# Patient Record
Sex: FEMALE | Race: WHITE | NOT HISPANIC OR LATINO | Employment: UNEMPLOYED | URBAN - METROPOLITAN AREA
[De-identification: names, ages, dates, MRNs, and addresses within clinical notes are randomized per-mention and may not be internally consistent; named-entity substitution may affect disease eponyms.]

---

## 2024-01-01 ENCOUNTER — HOSPITAL ENCOUNTER (EMERGENCY)
Facility: HOSPITAL | Age: 49
Discharge: PSYCHIATRIC OR SUBSTANCE USE FACILITY | End: 2024-01-02
Attending: EMERGENCY MEDICINE
Payer: COMMERCIAL

## 2024-01-01 VITALS
TEMPERATURE: 98 F | SYSTOLIC BLOOD PRESSURE: 120 MMHG | OXYGEN SATURATION: 96 % | BODY MASS INDEX: 35.38 KG/M2 | HEART RATE: 77 BPM | WEIGHT: 164 LBS | DIASTOLIC BLOOD PRESSURE: 74 MMHG | HEIGHT: 57 IN | RESPIRATION RATE: 17 BRPM

## 2024-01-01 DIAGNOSIS — R00.0 TACHYCARDIA: ICD-10-CM

## 2024-01-01 DIAGNOSIS — T50.902A INTENTIONAL OVERDOSE, INITIAL ENCOUNTER: Primary | ICD-10-CM

## 2024-01-01 LAB
ALBUMIN SERPL-MCNC: 4.1 G/DL (ref 3.5–5)
ALBUMIN/GLOB SERPL: 1.1 RATIO (ref 1.1–2)
ALP SERPL-CCNC: 117 UNIT/L (ref 40–150)
ALT SERPL-CCNC: 22 UNIT/L (ref 0–55)
AMPHET UR QL SCN: NEGATIVE
APAP SERPL-MCNC: <17.4 UG/ML (ref 17.4–30)
APPEARANCE UR: CLEAR
AST SERPL-CCNC: 28 UNIT/L (ref 5–34)
B-HCG SERPL QL: NEGATIVE
BARBITURATE SCN PRESENT UR: NEGATIVE
BASOPHILS # BLD AUTO: 0.03 X10(3)/MCL
BASOPHILS NFR BLD AUTO: 0.5 %
BENZODIAZ UR QL SCN: POSITIVE
BILIRUB SERPL-MCNC: 0.7 MG/DL
BILIRUB UR QL STRIP.AUTO: NEGATIVE
BUN SERPL-MCNC: 11 MG/DL (ref 9.8–20.1)
CALCIUM SERPL-MCNC: 9.9 MG/DL (ref 8.4–10.2)
CANNABINOIDS UR QL SCN: NEGATIVE
CHLORIDE SERPL-SCNC: 106 MMOL/L (ref 98–107)
CO2 SERPL-SCNC: 24 MMOL/L (ref 22–29)
COCAINE UR QL SCN: NEGATIVE
COLOR UR AUTO: YELLOW
CREAT SERPL-MCNC: 0.81 MG/DL (ref 0.55–1.02)
EOSINOPHIL # BLD AUTO: 0.01 X10(3)/MCL (ref 0–0.9)
EOSINOPHIL NFR BLD AUTO: 0.2 %
ERYTHROCYTE [DISTWIDTH] IN BLOOD BY AUTOMATED COUNT: 16 % (ref 11.5–17)
ETHANOL SERPL-MCNC: <10 MG/DL
FENTANYL UR QL SCN: NEGATIVE
GFR SERPLBLD CREATININE-BSD FMLA CKD-EPI: >60 MLS/MIN/1.73/M2
GLOBULIN SER-MCNC: 3.8 GM/DL (ref 2.4–3.5)
GLUCOSE SERPL-MCNC: 126 MG/DL (ref 74–100)
GLUCOSE UR QL STRIP.AUTO: NEGATIVE
HCT VFR BLD AUTO: 48.3 % (ref 37–47)
HGB BLD-MCNC: 15.6 G/DL (ref 12–16)
IMM GRANULOCYTES # BLD AUTO: 0.02 X10(3)/MCL (ref 0–0.04)
IMM GRANULOCYTES NFR BLD AUTO: 0.3 %
KETONES UR QL STRIP.AUTO: NEGATIVE
LEUKOCYTE ESTERASE UR QL STRIP.AUTO: NEGATIVE
LYMPHOCYTES # BLD AUTO: 1.43 X10(3)/MCL (ref 0.6–4.6)
LYMPHOCYTES NFR BLD AUTO: 24.5 %
MCH RBC QN AUTO: 28.6 PG (ref 27–31)
MCHC RBC AUTO-ENTMCNC: 32.3 G/DL (ref 33–36)
MCV RBC AUTO: 88.5 FL (ref 80–94)
MDMA UR QL SCN: NEGATIVE
MONOCYTES # BLD AUTO: 0.35 X10(3)/MCL (ref 0.1–1.3)
MONOCYTES NFR BLD AUTO: 6 %
NEUTROPHILS # BLD AUTO: 4 X10(3)/MCL (ref 2.1–9.2)
NEUTROPHILS NFR BLD AUTO: 68.5 %
NITRITE UR QL STRIP.AUTO: NEGATIVE
NRBC BLD AUTO-RTO: 0 %
OPIATES UR QL SCN: NEGATIVE
PCP UR QL: NEGATIVE
PH UR STRIP.AUTO: 6 [PH]
PH UR: 6 [PH] (ref 3–11)
PLATELET # BLD AUTO: 354 X10(3)/MCL (ref 130–400)
PMV BLD AUTO: 9.5 FL (ref 7.4–10.4)
POTASSIUM SERPL-SCNC: 3.9 MMOL/L (ref 3.5–5.1)
PROT SERPL-MCNC: 7.9 GM/DL (ref 6.4–8.3)
PROT UR QL STRIP.AUTO: NEGATIVE
RBC # BLD AUTO: 5.46 X10(6)/MCL (ref 4.2–5.4)
RBC UR QL AUTO: NEGATIVE
SARS-COV-2 RDRP RESP QL NAA+PROBE: NEGATIVE
SODIUM SERPL-SCNC: 140 MMOL/L (ref 136–145)
SP GR UR STRIP.AUTO: 1.02 (ref 1–1.03)
SPECIFIC GRAVITY, URINE AUTO (.000) (OHS): 1.02 (ref 1–1.03)
TSH SERPL-ACNC: 0.61 UIU/ML (ref 0.35–4.94)
UROBILINOGEN UR STRIP-ACNC: 0.2
WBC # SPEC AUTO: 5.84 X10(3)/MCL (ref 4.5–11.5)

## 2024-01-01 PROCEDURE — 93010 ELECTROCARDIOGRAM REPORT: CPT | Mod: ,,, | Performed by: INTERNAL MEDICINE

## 2024-01-01 PROCEDURE — 93005 ELECTROCARDIOGRAM TRACING: CPT | Performed by: INTERNAL MEDICINE

## 2024-01-01 PROCEDURE — 81003 URINALYSIS AUTO W/O SCOPE: CPT | Mod: 59 | Performed by: EMERGENCY MEDICINE

## 2024-01-01 PROCEDURE — 80307 DRUG TEST PRSMV CHEM ANLYZR: CPT | Performed by: EMERGENCY MEDICINE

## 2024-01-01 PROCEDURE — 80053 COMPREHEN METABOLIC PANEL: CPT | Performed by: EMERGENCY MEDICINE

## 2024-01-01 PROCEDURE — 87635 SARS-COV-2 COVID-19 AMP PRB: CPT | Performed by: EMERGENCY MEDICINE

## 2024-01-01 PROCEDURE — 80143 DRUG ASSAY ACETAMINOPHEN: CPT | Performed by: EMERGENCY MEDICINE

## 2024-01-01 PROCEDURE — 82077 ASSAY SPEC XCP UR&BREATH IA: CPT | Performed by: EMERGENCY MEDICINE

## 2024-01-01 PROCEDURE — 81025 URINE PREGNANCY TEST: CPT | Performed by: EMERGENCY MEDICINE

## 2024-01-01 PROCEDURE — 84443 ASSAY THYROID STIM HORMONE: CPT | Performed by: EMERGENCY MEDICINE

## 2024-01-01 PROCEDURE — 85025 COMPLETE CBC W/AUTO DIFF WBC: CPT | Performed by: EMERGENCY MEDICINE

## 2024-01-01 PROCEDURE — 99285 EMERGENCY DEPT VISIT HI MDM: CPT | Mod: 25

## 2024-01-01 PROCEDURE — 25000003 PHARM REV CODE 250: Performed by: EMERGENCY MEDICINE

## 2024-01-01 PROCEDURE — 96360 HYDRATION IV INFUSION INIT: CPT

## 2024-01-01 PROCEDURE — 93005 ELECTROCARDIOGRAM TRACING: CPT

## 2024-01-01 RX ORDER — DILTIAZEM HYDROCHLORIDE 120 MG/1
120 CAPSULE, EXTENDED RELEASE ORAL DAILY
Qty: 30 CAPSULE | Refills: 1 | Status: SHIPPED | OUTPATIENT
Start: 2024-01-01 | End: 2024-01-02

## 2024-01-01 RX ORDER — DILTIAZEM HYDROCHLORIDE 30 MG/1
30 TABLET, FILM COATED ORAL
Status: COMPLETED | OUTPATIENT
Start: 2024-01-01 | End: 2024-01-01

## 2024-01-01 RX ADMIN — DILTIAZEM HYDROCHLORIDE 30 MG: 30 TABLET, FILM COATED ORAL at 09:01

## 2024-01-01 RX ADMIN — SODIUM CHLORIDE 1000 ML: 9 INJECTION, SOLUTION INTRAVENOUS at 06:01

## 2024-01-01 NOTE — ED PROVIDER NOTES
Encounter Date: 1/1/2024       History     Chief Complaint   Patient presents with    Drug Overdose     Pt to Ed via EMS with suspected OD, pt has multiple stories of what she did. 1. OD of lisinopril, 2. OD on benazepril. Pt appears intoxicated, slurred speech, Alert to self. Denies SI/HI     Patient is a 48-year-old female presenting by ambulance secondary to apparent overdose.  Patient tells me that she took approximately 15 benazepril tablets and about 10 lorazepam tablets earlier today.  Patient states was trying to induce sleep.  Patient denies any other ingestion.      Review of patient's allergies indicates:  No Known Allergies  History reviewed. No pertinent past medical history.  History reviewed. No pertinent surgical history.  History reviewed. No pertinent family history.  Social History     Tobacco Use    Smoking status: Every Day     Types: Cigarettes    Smokeless tobacco: Never   Substance Use Topics    Alcohol use: Not Currently    Drug use: Not Currently     Comment: unknown     Review of Systems   Constitutional: Negative.    HENT: Negative.     Respiratory: Negative.     Cardiovascular: Negative.    Gastrointestinal: Negative.    Genitourinary: Negative.    Musculoskeletal: Negative.    Skin: Negative.    Neurological: Negative.    Psychiatric/Behavioral:  Positive for dysphoric mood, self-injury and sleep disturbance. Negative for hallucinations and suicidal ideas.        Physical Exam     Initial Vitals [01/01/24 1624]   BP Pulse Resp Temp SpO2   (!) 139/94 96 16 97 °F (36.1 °C) 97 %      MAP       --         Physical Exam    Nursing note and vitals reviewed.  Constitutional: She appears well-developed and well-nourished.   Patient is somnolent on arrival but she will open her eyes and answer questions appropriately.   HENT:   Head: Normocephalic and atraumatic.   Neck: Neck supple.   Normal range of motion.  Cardiovascular:  Normal rate, regular rhythm and normal heart sounds.            Pulmonary/Chest: Breath sounds normal. No respiratory distress. She has no rhonchi.   Abdominal: Abdomen is soft. There is no abdominal tenderness. There is no guarding.   Musculoskeletal:         General: Normal range of motion.      Cervical back: Normal range of motion and neck supple.     Neurological: She is alert and oriented to person, place, and time. She has normal strength.   Skin: Skin is warm.   Psychiatric:   Positive for dysphoric mood.  Patient denies suicidal ideation.  She denies hallucinations.         ED Course   Procedures  Labs Reviewed   COMPREHENSIVE METABOLIC PANEL - Abnormal; Notable for the following components:       Result Value    Glucose Level 126 (*)     Globulin 3.8 (*)     All other components within normal limits   DRUG SCREEN, URINE (BEAKER) - Abnormal; Notable for the following components:    Benzodiazepine, Urine Positive (*)     All other components within normal limits    Narrative:     Cut off concentrations:    Amphetamines - 1000 ng/ml  Barbiturates - 200 ng/ml  Benzodiazepine - 200 ng/ml  Cannabinoids (THC) - 50 ng/ml  Cocaine - 300 ng/ml  Fentanyl - 1.0 ng/ml  MDMA - 500 ng/ml  Opiates - 300 ng/ml   Phencyclidine (PCP) - 25 ng/ml    Specimen submitted for drug analysis and tested for pH and specific gravity in order to evaluate sample integrity. Suspect tampering if specific gravity is <1.003 and/or pH is not within the range of 4.5 - 8.0  False negatives may result form substances such as bleach added to urine.  False positives may result for the presence of a substance with similar chemical structure to the drug or its metabolite.    This test provides only a PRELIMINARY analytical test result. A more specific alternate chemical method must be used in order to obtain a confirmed analytical result. Gas chromatography/mass spectrometry (GC/MS) is the preferred confirmatory method. Other chemical confirmation methods are available. Clinical consideration and professional  judgement should be applied to any drug of abuse test result, particularly when preliminary positive results are used.    Positive results will be confirmed only at the physicians request. Unconfirmed screening results are to be used only for medical purposes (treatment).        ACETAMINOPHEN LEVEL - Abnormal; Notable for the following components:    Acetaminophen Level <17.4 (*)     All other components within normal limits   CBC WITH DIFFERENTIAL - Abnormal; Notable for the following components:    RBC 5.46 (*)     Hct 48.3 (*)     MCHC 32.3 (*)     All other components within normal limits   TSH - Normal   URINALYSIS, REFLEX TO URINE CULTURE - Normal   ALCOHOL,MEDICAL (ETHANOL) - Normal   SARS-COV-2 RNA AMPLIFICATION, QUAL - Normal    Narrative:     The IDNOW COVID-19 assay is a rapid molecular in vitro diagnostic test utilizing an isothermal nucleic acid amplification technology intended for the qualitative detection of nucleic acid from the SARS-CoV-2 viral RNA in direct nasal, nasopharyngeal or throat swabs from individuals who are suspected of COVID-19 by their healthcare provider.   PREGNANCY TEST, URINE RAPID - Normal   CBC W/ AUTO DIFFERENTIAL    Narrative:     The following orders were created for panel order CBC auto differential.  Procedure                               Abnormality         Status                     ---------                               -----------         ------                     CBC with Differential[[id number]]       Abnormal            Final result                 Please view results for these tests on the individual orders.     EKG Readings: (Independently Interpreted)   Initial Reading: No STEMI. Rhythm: Normal Sinus Rhythm. Heart Rate: 88. Ectopy: No Ectopy. Conduction: Normal. ST Segments: Normal ST Segments. T Waves: Normal. Axis: Normal.       Imaging Results    None          Medications   diltiaZEM tablet 30 mg (has no administration in time range)   sodium chloride 0.9%  bolus 1,000 mL 1,000 mL ( Intravenous Stopped 1/1/24 1957)     Medical Decision Making  As per HPI.  Differential diagnosis:  Self injury, medication overdose, somnolence    Amount and/or Complexity of Data Reviewed  Labs: ordered.     Details: All labs are stable  Radiology: ordered and independent interpretation performed.  ECG/medicine tests: ordered and independent interpretation performed.  Discussion of management or test interpretation with external provider(s): Patient remains in no apparent distress.  Vital signs are stable.  Patient will continue to be observed for a while secondary to intentional ingestion.  Pec has been done with this patient.  Anticipate clearance for psych placement.  Patient will be given a prescription for Cardizem 180 mg p.o. once a day.  Patient is now medically cleared for psychiatric placement.               ED Course as of 01/01/24 2059 Mon Jan 01, 2024   1648 Pec was don this patient.  [place] poison control was also called.  They recommend no charcoal, watch for reflex tachycardia and hypotension. [KG]   1800 Vital signs remained stable.  Patient remains in no apparent distress.  Will continue to observe [KG]   2002 Patient became tachycardic with a heart rate in the 130s.  Blood pressure is stable.  Patient denies chest pain.  No shortness of breath [KG]   2002 EKG was repeated, prior to EKG, patient converted into normal sinus rhythm.  It appeared like she was in an SVT.  Repeat EKG, patient is in normal sinus rhythm.  Heart rate 88 [KG]   2038 Patient remains in no apparent distress.  Vital signs are stable.  Patient is alert and oriented. [KG]      ED Course User Index  [KG] Gavin Marley MD                           Clinical Impression:  Final diagnoses:  [R00.0] Tachycardia  [T50.902A] Intentional overdose, initial encounter (Primary)          ED Disposition Condition    Transfer to Psych Facility Stable          ED Prescriptions       Medication Sig Dispense  Start Date End Date Auth. Provider    diltiaZEM (DILACOR XR) 120 MG CDCR Take 1 capsule (120 mg total) by mouth once daily. 30 capsule 1/1/2024 -- Gavin Marley MD          Follow-up Information    None          Gavin Marley MD  01/01/24 2059

## 2024-01-01 NOTE — ED NOTES
"Pt brought to room 1 via EMS. Pt states she "took a bunch of benzapril." Pt appears drowsy and lethargic. Pt VS stable. Pt pupils dialated. Pt denies SI or HI. Pt states she wanted to go to sleep while her children moved furniture. Pt states she "wants her kids back." MD at bedside.  "

## 2024-01-01 NOTE — ED NOTES
"Poison controlled called. Kirk from poison control states that if pt took the benazapril, we may see reflex tachycardia and low blood pressure. Poison control does not recommend activated charcoal due to symptoms already "being on board." Poison control requests a tylenol level and urine drug screen.  "

## 2024-01-02 ENCOUNTER — HOSPITAL ENCOUNTER (INPATIENT)
Facility: HOSPITAL | Age: 49
LOS: 1 days | Discharge: ANOTHER HOSPITAL | DRG: 881 | End: 2024-01-02
Attending: PSYCHIATRY & NEUROLOGY | Admitting: PSYCHIATRY & NEUROLOGY
Payer: COMMERCIAL

## 2024-01-02 ENCOUNTER — HOSPITAL ENCOUNTER (INPATIENT)
Facility: HOSPITAL | Age: 49
LOS: 7 days | Discharge: HOME | DRG: 918 | End: 2024-01-09
Attending: PSYCHIATRY & NEUROLOGY | Admitting: PSYCHIATRY & NEUROLOGY
Payer: COMMERCIAL

## 2024-01-02 VITALS
HEART RATE: 93 BPM | RESPIRATION RATE: 18 BRPM | SYSTOLIC BLOOD PRESSURE: 126 MMHG | TEMPERATURE: 98 F | BODY MASS INDEX: 35.6 KG/M2 | OXYGEN SATURATION: 97 % | DIASTOLIC BLOOD PRESSURE: 87 MMHG | WEIGHT: 165 LBS | HEIGHT: 57 IN

## 2024-01-02 VITALS
HEIGHT: 68 IN | RESPIRATION RATE: 18 BRPM | SYSTOLIC BLOOD PRESSURE: 121 MMHG | OXYGEN SATURATION: 99 % | BODY MASS INDEX: 26.83 KG/M2 | TEMPERATURE: 98 F | WEIGHT: 177 LBS | HEART RATE: 73 BPM | DIASTOLIC BLOOD PRESSURE: 76 MMHG

## 2024-01-02 DIAGNOSIS — R55 SYNCOPAL EPISODES: ICD-10-CM

## 2024-01-02 DIAGNOSIS — T14.91XA SUICIDE ATTEMPT: Primary | ICD-10-CM

## 2024-01-02 DIAGNOSIS — F32.9 MAJOR DEPRESSION: ICD-10-CM

## 2024-01-02 DIAGNOSIS — F32.A DEPRESSION: ICD-10-CM

## 2024-01-02 LAB
ALBUMIN SERPL-MCNC: 3.7 G/DL (ref 3.5–5)
ALBUMIN/GLOB SERPL: 1.3 RATIO (ref 1.1–2)
ALP SERPL-CCNC: 108 UNIT/L (ref 40–150)
ALT SERPL-CCNC: 19 UNIT/L (ref 0–55)
AST SERPL-CCNC: 28 UNIT/L (ref 5–34)
BASOPHILS # BLD AUTO: 0.04 X10(3)/MCL
BASOPHILS NFR BLD AUTO: 0.6 %
BILIRUB SERPL-MCNC: 0.6 MG/DL
BUN SERPL-MCNC: 11.2 MG/DL (ref 9.8–20.1)
CALCIUM SERPL-MCNC: 9.2 MG/DL (ref 8.4–10.2)
CHLORIDE SERPL-SCNC: 108 MMOL/L (ref 98–107)
CHOLEST SERPL-MCNC: 143 MG/DL
CHOLEST/HDLC SERPL: 2 {RATIO} (ref 0–5)
CO2 SERPL-SCNC: 24 MMOL/L (ref 22–29)
CREAT SERPL-MCNC: 0.68 MG/DL (ref 0.55–1.02)
EOSINOPHIL # BLD AUTO: 0.03 X10(3)/MCL (ref 0–0.9)
EOSINOPHIL NFR BLD AUTO: 0.4 %
ERYTHROCYTE [DISTWIDTH] IN BLOOD BY AUTOMATED COUNT: 15.9 % (ref 11.5–17)
EST. AVERAGE GLUCOSE BLD GHB EST-MCNC: 119.8 MG/DL
GFR SERPLBLD CREATININE-BSD FMLA CKD-EPI: >60 MLS/MIN/1.73/M2
GLOBULIN SER-MCNC: 2.9 GM/DL (ref 2.4–3.5)
GLUCOSE SERPL-MCNC: 91 MG/DL (ref 74–100)
HBA1C MFR BLD: 5.8 %
HCT VFR BLD AUTO: 45.4 % (ref 37–47)
HDLC SERPL-MCNC: 65 MG/DL (ref 35–60)
HGB BLD-MCNC: 14.6 G/DL (ref 12–16)
IMM GRANULOCYTES # BLD AUTO: 0.02 X10(3)/MCL (ref 0–0.04)
IMM GRANULOCYTES NFR BLD AUTO: 0.3 %
LDLC SERPL CALC-MCNC: 61 MG/DL (ref 50–140)
LYMPHOCYTES # BLD AUTO: 1.45 X10(3)/MCL (ref 0.6–4.6)
LYMPHOCYTES NFR BLD AUTO: 20.2 %
MCH RBC QN AUTO: 28.7 PG (ref 27–31)
MCHC RBC AUTO-ENTMCNC: 32.2 G/DL (ref 33–36)
MCV RBC AUTO: 89.4 FL (ref 80–94)
MONOCYTES # BLD AUTO: 0.8 X10(3)/MCL (ref 0.1–1.3)
MONOCYTES NFR BLD AUTO: 11.1 %
NEUTROPHILS # BLD AUTO: 4.84 X10(3)/MCL (ref 2.1–9.2)
NEUTROPHILS NFR BLD AUTO: 67.4 %
NRBC BLD AUTO-RTO: 0 %
PLATELET # BLD AUTO: 324 X10(3)/MCL (ref 130–400)
PMV BLD AUTO: 10.1 FL (ref 7.4–10.4)
POTASSIUM SERPL-SCNC: 4.4 MMOL/L (ref 3.5–5.1)
PROT SERPL-MCNC: 6.6 GM/DL (ref 6.4–8.3)
RBC # BLD AUTO: 5.08 X10(6)/MCL (ref 4.2–5.4)
SODIUM SERPL-SCNC: 143 MMOL/L (ref 136–145)
T PALLIDUM AB SER QL: NONREACTIVE
TRIGL SERPL-MCNC: 87 MG/DL (ref 37–140)
TSH SERPL-ACNC: 0.43 UIU/ML (ref 0.35–4.94)
VLDLC SERPL CALC-MCNC: 17 MG/DL
WBC # SPEC AUTO: 7.18 X10(3)/MCL (ref 4.5–11.5)

## 2024-01-02 PROCEDURE — 99284 EMERGENCY DEPT VISIT MOD MDM: CPT | Mod: 25

## 2024-01-02 PROCEDURE — 93005 ELECTROCARDIOGRAM TRACING: CPT

## 2024-01-02 PROCEDURE — 84443 ASSAY THYROID STIM HORMONE: CPT | Performed by: PSYCHIATRY & NEUROLOGY

## 2024-01-02 PROCEDURE — 83036 HEMOGLOBIN GLYCOSYLATED A1C: CPT | Performed by: PSYCHIATRY & NEUROLOGY

## 2024-01-02 PROCEDURE — 80053 COMPREHEN METABOLIC PANEL: CPT | Performed by: PSYCHIATRY & NEUROLOGY

## 2024-01-02 PROCEDURE — 93010 ELECTROCARDIOGRAM REPORT: CPT | Mod: ,,, | Performed by: INTERNAL MEDICINE

## 2024-01-02 PROCEDURE — 86780 TREPONEMA PALLIDUM: CPT | Performed by: PSYCHIATRY & NEUROLOGY

## 2024-01-02 PROCEDURE — 11400000 HC PSYCH PRIVATE ROOM

## 2024-01-02 PROCEDURE — 80061 LIPID PANEL: CPT | Performed by: PSYCHIATRY & NEUROLOGY

## 2024-01-02 PROCEDURE — 85025 COMPLETE CBC W/AUTO DIFF WBC: CPT | Performed by: PSYCHIATRY & NEUROLOGY

## 2024-01-02 RX ORDER — IBUPROFEN 200 MG
1 TABLET ORAL DAILY
Status: DISCONTINUED | OUTPATIENT
Start: 2024-01-02 | End: 2024-01-02 | Stop reason: HOSPADM

## 2024-01-02 RX ORDER — HALOPERIDOL 5 MG/ML
10 INJECTION INTRAMUSCULAR EVERY 4 HOURS PRN
Status: DISCONTINUED | OUTPATIENT
Start: 2024-01-02 | End: 2024-01-09 | Stop reason: HOSPADM

## 2024-01-02 RX ORDER — ROSUVASTATIN CALCIUM 10 MG/1
10 TABLET, COATED ORAL
Status: ON HOLD | COMMUNITY
Start: 2023-12-21 | End: 2024-01-08 | Stop reason: HOSPADM

## 2024-01-02 RX ORDER — MUPIROCIN 20 MG/G
OINTMENT TOPICAL 2 TIMES DAILY
Status: DISCONTINUED | OUTPATIENT
Start: 2024-01-02 | End: 2024-01-02 | Stop reason: HOSPADM

## 2024-01-02 RX ORDER — HYDROXYZINE HYDROCHLORIDE 50 MG/1
50 TABLET, FILM COATED ORAL EVERY 4 HOURS PRN
Status: DISCONTINUED | OUTPATIENT
Start: 2024-01-02 | End: 2024-01-02 | Stop reason: HOSPADM

## 2024-01-02 RX ORDER — DIPHENHYDRAMINE HYDROCHLORIDE 50 MG/ML
50 INJECTION, SOLUTION INTRAMUSCULAR; INTRAVENOUS EVERY 4 HOURS PRN
Status: DISCONTINUED | OUTPATIENT
Start: 2024-01-02 | End: 2024-01-02 | Stop reason: HOSPADM

## 2024-01-02 RX ORDER — ACETAMINOPHEN 325 MG/1
650 TABLET ORAL EVERY 6 HOURS PRN
Status: DISCONTINUED | OUTPATIENT
Start: 2024-01-02 | End: 2024-01-09 | Stop reason: HOSPADM

## 2024-01-02 RX ORDER — MAG HYDROX/ALUMINUM HYD/SIMETH 200-200-20
30 SUSPENSION, ORAL (FINAL DOSE FORM) ORAL EVERY 6 HOURS PRN
Status: DISCONTINUED | OUTPATIENT
Start: 2024-01-02 | End: 2024-01-09 | Stop reason: HOSPADM

## 2024-01-02 RX ORDER — LORAZEPAM 1 MG/1
2 TABLET ORAL EVERY 4 HOURS PRN
Status: DISCONTINUED | OUTPATIENT
Start: 2024-01-02 | End: 2024-01-02 | Stop reason: HOSPADM

## 2024-01-02 RX ORDER — LISINOPRIL 10 MG/1
10 TABLET ORAL
Status: ON HOLD | COMMUNITY
Start: 2023-12-19 | End: 2024-01-08 | Stop reason: HOSPADM

## 2024-01-02 RX ORDER — HALOPERIDOL 5 MG/1
10 TABLET ORAL EVERY 4 HOURS PRN
Status: DISCONTINUED | OUTPATIENT
Start: 2024-01-02 | End: 2024-01-02 | Stop reason: HOSPADM

## 2024-01-02 RX ORDER — DIPHENHYDRAMINE HCL 50 MG
50 CAPSULE ORAL EVERY 4 HOURS PRN
Status: DISCONTINUED | OUTPATIENT
Start: 2024-01-02 | End: 2024-01-09 | Stop reason: HOSPADM

## 2024-01-02 RX ORDER — TRAZODONE HYDROCHLORIDE 100 MG/1
100 TABLET ORAL NIGHTLY PRN
Status: DISCONTINUED | OUTPATIENT
Start: 2024-01-02 | End: 2024-01-02 | Stop reason: HOSPADM

## 2024-01-02 RX ORDER — DOXYCYCLINE 100 MG/1
100 CAPSULE ORAL 2 TIMES DAILY
Status: ON HOLD | COMMUNITY
Start: 2023-12-22 | End: 2024-01-08

## 2024-01-02 RX ORDER — MAG HYDROX/ALUMINUM HYD/SIMETH 200-200-20
30 SUSPENSION, ORAL (FINAL DOSE FORM) ORAL EVERY 6 HOURS PRN
Status: DISCONTINUED | OUTPATIENT
Start: 2024-01-02 | End: 2024-01-02 | Stop reason: HOSPADM

## 2024-01-02 RX ORDER — CEPHALEXIN 500 MG/1
500 CAPSULE ORAL 4 TIMES DAILY
Status: ON HOLD | COMMUNITY
Start: 2023-10-02 | End: 2024-01-08

## 2024-01-02 RX ORDER — HYDROXYZINE PAMOATE 25 MG/1
25 CAPSULE ORAL NIGHTLY
Status: ON HOLD | COMMUNITY
Start: 2023-10-09 | End: 2024-01-08

## 2024-01-02 RX ORDER — LORAZEPAM 2 MG/ML
2 INJECTION INTRAMUSCULAR EVERY 4 HOURS PRN
Status: DISCONTINUED | OUTPATIENT
Start: 2024-01-02 | End: 2024-01-09 | Stop reason: HOSPADM

## 2024-01-02 RX ORDER — ESCITALOPRAM OXALATE 10 MG/1
10 TABLET ORAL
Status: ON HOLD | COMMUNITY
Start: 2023-10-07 | End: 2024-01-08 | Stop reason: HOSPADM

## 2024-01-02 RX ORDER — HALOPERIDOL 5 MG/1
10 TABLET ORAL EVERY 4 HOURS PRN
Status: DISCONTINUED | OUTPATIENT
Start: 2024-01-02 | End: 2024-01-09 | Stop reason: HOSPADM

## 2024-01-02 RX ORDER — LORAZEPAM 1 MG/1
2 TABLET ORAL EVERY 4 HOURS PRN
Status: DISCONTINUED | OUTPATIENT
Start: 2024-01-02 | End: 2024-01-09 | Stop reason: HOSPADM

## 2024-01-02 RX ORDER — HYDROXYZINE HYDROCHLORIDE 50 MG/1
50 TABLET, FILM COATED ORAL EVERY 4 HOURS PRN
Status: DISCONTINUED | OUTPATIENT
Start: 2024-01-02 | End: 2024-01-09 | Stop reason: HOSPADM

## 2024-01-02 RX ORDER — DILTIAZEM HYDROCHLORIDE 120 MG/1
120 CAPSULE, COATED, EXTENDED RELEASE ORAL DAILY
Status: DISCONTINUED | OUTPATIENT
Start: 2024-01-02 | End: 2024-01-09 | Stop reason: HOSPADM

## 2024-01-02 RX ORDER — ACETAMINOPHEN 325 MG/1
650 TABLET ORAL EVERY 6 HOURS PRN
Status: DISCONTINUED | OUTPATIENT
Start: 2024-01-02 | End: 2024-01-02 | Stop reason: HOSPADM

## 2024-01-02 RX ORDER — TRAZODONE HYDROCHLORIDE 100 MG/1
100 TABLET ORAL NIGHTLY PRN
Status: DISCONTINUED | OUTPATIENT
Start: 2024-01-02 | End: 2024-01-09 | Stop reason: HOSPADM

## 2024-01-02 RX ORDER — DIPHENHYDRAMINE HCL 50 MG
50 CAPSULE ORAL EVERY 4 HOURS PRN
Status: DISCONTINUED | OUTPATIENT
Start: 2024-01-02 | End: 2024-01-02 | Stop reason: HOSPADM

## 2024-01-02 RX ORDER — ESCITALOPRAM OXALATE 20 MG/1
20 TABLET ORAL
Status: ON HOLD | COMMUNITY
Start: 2023-12-19 | End: 2024-01-08 | Stop reason: HOSPADM

## 2024-01-02 RX ORDER — LORAZEPAM 2 MG/ML
2 INJECTION INTRAMUSCULAR EVERY 4 HOURS PRN
Status: DISCONTINUED | OUTPATIENT
Start: 2024-01-02 | End: 2024-01-02 | Stop reason: HOSPADM

## 2024-01-02 RX ORDER — PRAMIPEXOLE DIHYDROCHLORIDE 0.25 MG/1
0.25 TABLET ORAL NIGHTLY
Status: ON HOLD | COMMUNITY
Start: 2023-11-06 | End: 2024-01-09

## 2024-01-02 RX ORDER — ALPRAZOLAM 0.5 MG/1
0.5 TABLET ORAL 2 TIMES DAILY PRN
Status: ON HOLD | COMMUNITY
Start: 2023-12-19 | End: 2024-01-08

## 2024-01-02 RX ORDER — PREGABALIN 75 MG/1
75 CAPSULE ORAL 2 TIMES DAILY
Status: ON HOLD | COMMUNITY
Start: 2023-12-09 | End: 2024-01-09 | Stop reason: HOSPADM

## 2024-01-02 RX ORDER — HALOPERIDOL 5 MG/ML
10 INJECTION INTRAMUSCULAR EVERY 4 HOURS PRN
Status: DISCONTINUED | OUTPATIENT
Start: 2024-01-02 | End: 2024-01-02 | Stop reason: HOSPADM

## 2024-01-02 RX ORDER — IBUPROFEN 200 MG
1 TABLET ORAL DAILY
Status: DISCONTINUED | OUTPATIENT
Start: 2024-01-03 | End: 2024-01-03

## 2024-01-02 RX ORDER — DIPHENHYDRAMINE HYDROCHLORIDE 50 MG/ML
50 INJECTION, SOLUTION INTRAMUSCULAR; INTRAVENOUS EVERY 4 HOURS PRN
Status: DISCONTINUED | OUTPATIENT
Start: 2024-01-02 | End: 2024-01-09 | Stop reason: HOSPADM

## 2024-01-02 NOTE — H&P
1/2/2024  Jessa Elizalde   1975   [id number]            Psychiatry Inpatient Admission Note    Date of Admission: 1/2/2024  1:08 AM    Current Legal Status: Physician's Emergency Certificate    Chief Complaint: Suicide attempt    SUBJECTIVE:   History of Present Illness:   Jessa Elizalde is a 48 y.o. female placed under a PEC at [facility] after taking approximately 15 benazepril tablets and about 10 lorazepam tablets. Prior to doing an evaluation patient was found unresponsive in the bathroom with her mouth stuffed with tissue paper. I removed the tissue paper and was able to arouse the patient with manual stimulation. Patient was not oriented to person or place. She did speak to me and staff. EMS was contacted to transport patient to the ED for medical clearance Vital signs were checked and stable. Pulse was 99 and palpable. No irregularities felt. No medications were started. Will have the patient evaluated in the ED and begin treatment for depression once patient returns.     The below information where available was pulled from previous records.     Past Psychiatric History:   Previous Psychiatric Hospitalizations: Unknown   Previous Medication Trials: Xanax, Lexapro, Vistaril  Previous Suicide Attempts: Unknown    Outpatient psychiatrist: Narciso Arevalo    Past Medical/Surgical History:   No past medical history on file.  No past surgical history on file.      Family Psychiatric History:   Unknown      Allergies:   Review of patient's allergies indicates:  No Known Allergies    Substance Abuse History:   Tobacco: Unknown   Alcohol: Unknown   Illicit Substances: Unknown   Treatment: Unknown       Current Medications:   Home Psychiatric Meds: Unknown     Scheduled Meds:    mupirocin   Nasal BID    nicotine  1 patch Transdermal Daily      PRN Meds: acetaminophen, aluminum-magnesium hydroxide-simethicone, haloperidoL **AND** diphenhydrAMINE **AND** LORazepam **AND** haloperidol lactate **AND**  diphenhydrAMINE **AND** lorazepam, hydrOXYzine HCL, traZODone   Psychotherapeutics (From admission, onward)      Start     Stop Route Frequency Ordered    01/02/24 0306  traZODone tablet 100 mg         -- Oral Nightly PRN 01/02/24 0306    01/02/24 0304  haloperidoL tablet 10 mg  (Med - Acute  Behavioral Management)        See Hyperspace for full Linked Orders Report.    -- Oral Every 4 hours PRN 01/02/24 0304    01/02/24 0304  LORazepam tablet 2 mg  (Med - Acute  Behavioral Management)        See Hyperspace for full Linked Orders Report.    -- Oral Every 4 hours PRN 01/02/24 0304    01/02/24 0304  haloperidol lactate injection 10 mg  (Med - Acute  Behavioral Management)        See Hyperspace for full Linked Orders Report.    -- IM Every 4 hours PRN 01/02/24 0304    01/02/24 0304  LORazepam injection 2 mg  (Med - Acute  Behavioral Management)        See Hyperspace for full Linked Orders Report.    -- IM Every 4 hours PRN 01/02/24 0304              Social History:  Housing Status: Unknown   Relationship Status/Sexual Orientation: Unknown    Children: Unknown   Education: Unknown    Employment Status/Info: Unknown     history: Unknown   History of physical/sexual abuse: Unknown    Access to gun: Unknown        Legal History:   Past Charges/Incarcerations: Unknown    Pending charges: Unknown       OBJECTIVE:   Medical Review Of Systems:  Unknown     Vitals   Vitals:    01/02/24 0701   BP: 126/87   Pulse: 93   Resp: 18   Temp: 98.3 °F (36.8 °C)        Labs/Imaging/Studies:   Recent Results (from the past 48 hour(s))   COVID-19 Rapid Screening    Collection Time: 01/01/24  4:18 PM   Result Value Ref Range    SARS COV-2 MOLECULAR Negative Negative   Comprehensive metabolic panel    Collection Time: 01/01/24  4:35 PM   Result Value Ref Range    Sodium Level 140 136 - 145 mmol/L    Potassium Level 3.9 3.5 - 5.1 mmol/L    Chloride 106 98 - 107 mmol/L    Carbon Dioxide 24 22 - 29 mmol/L    Glucose Level 126 (H) 74 -  100 mg/dL    Blood Urea Nitrogen 11.0 9.8 - 20.1 mg/dL    Creatinine 0.81 0.55 - 1.02 mg/dL    Calcium Level Total 9.9 8.4 - 10.2 mg/dL    Protein Total 7.9 6.4 - 8.3 gm/dL    Albumin Level 4.1 3.5 - 5.0 g/dL    Globulin 3.8 (H) 2.4 - 3.5 gm/dL    Albumin/Globulin Ratio 1.1 1.1 - 2.0 ratio    Bilirubin Total 0.7 <=1.5 mg/dL    Alkaline Phosphatase 117 40 - 150 unit/L    Alanine Aminotransferase 22 0 - 55 unit/L    Aspartate Aminotransferase 28 5 - 34 unit/L    eGFR >60 mls/min/1.73/m2   TSH    Collection Time: 01/01/24  4:35 PM   Result Value Ref Range    TSH 0.606 0.350 - 4.940 uIU/mL   Ethanol    Collection Time: 01/01/24  4:35 PM   Result Value Ref Range    Ethanol Level <10.0 <=10.0 mg/dL   Acetaminophen level    Collection Time: 01/01/24  4:35 PM   Result Value Ref Range    Acetaminophen Level <17.4 (L) 17.4 - 30.0 ug/ml   CBC with Differential    Collection Time: 01/01/24  4:35 PM   Result Value Ref Range    WBC 5.84 4.50 - 11.50 x10(3)/mcL    RBC 5.46 (H) 4.20 - 5.40 x10(6)/mcL    Hgb 15.6 12.0 - 16.0 g/dL    Hct 48.3 (H) 37.0 - 47.0 %    MCV 88.5 80.0 - 94.0 fL    MCH 28.6 27.0 - 31.0 pg    MCHC 32.3 (L) 33.0 - 36.0 g/dL    RDW 16.0 11.5 - 17.0 %    Platelet 354 130 - 400 x10(3)/mcL    MPV 9.5 7.4 - 10.4 fL    Neut % 68.5 %    Lymph % 24.5 %    Mono % 6.0 %    Eos % 0.2 %    Basophil % 0.5 %    Lymph # 1.43 0.6 - 4.6 x10(3)/mcL    Neut # 4.00 2.1 - 9.2 x10(3)/mcL    Mono # 0.35 0.1 - 1.3 x10(3)/mcL    Eos # 0.01 0 - 0.9 x10(3)/mcL    Baso # 0.03 <=0.2 x10(3)/mcL    IG# 0.02 0 - 0.04 x10(3)/mcL    IG% 0.3 %    NRBC% 0.0 %   Urinalysis, Reflex to Urine Culture    Collection Time: 01/01/24  5:15 PM    Specimen: Urine   Result Value Ref Range    Color, UA Yellow Yellow, Light-Yellow, Dark Yellow, Lilia, Straw    Appearance, UA Clear Clear    Specific Gravity, UA 1.025 1.005 - 1.030    pH, UA 6.0 5.0 - 8.5    Protein, UA Negative Negative    Glucose, UA Negative Negative, Normal    Ketones, UA Negative Negative     Blood, UA Negative Negative    Bilirubin, UA Negative Negative    Urobilinogen, UA 0.2 0.2, 1.0, Normal    Nitrites, UA Negative Negative    Leukocyte Esterase, UA Negative Negative   Drug Screen, Urine    Collection Time: 01/01/24  5:15 PM   Result Value Ref Range    Amphetamines, Urine Negative Negative    Barbituates, Urine Negative Negative    Benzodiazepine, Urine Positive (A) Negative    Cannabinoids, Urine Negative Negative    Cocaine, Urine Negative Negative    Fentanyl, Urine Negative Negative    MDMA, Urine Negative Negative    Opiates, Urine Negative Negative    Phencyclidine, Urine Negative Negative    pH, Urine 6.0 3.0 - 11.0    Specific Gravity, Urine Auto 1.025 1.001 - 1.035   Pregnancy, urine rapid    Collection Time: 01/01/24  5:15 PM   Result Value Ref Range    Beta hCG Qualitative, Urine Negative Negative   Hemoglobin A1C    Collection Time: 01/02/24  7:48 AM   Result Value Ref Range    Hemoglobin A1c 5.8 <=7.0 %    Estimated Average Glucose 119.8 mg/dL   Comprehensive Metabolic Panel    Collection Time: 01/02/24  7:48 AM   Result Value Ref Range    Sodium Level 143 136 - 145 mmol/L    Potassium Level 4.4 3.5 - 5.1 mmol/L    Chloride 108 (H) 98 - 107 mmol/L    Carbon Dioxide 24 22 - 29 mmol/L    Glucose Level 91 74 - 100 mg/dL    Blood Urea Nitrogen 11.2 9.8 - 20.1 mg/dL    Creatinine 0.68 0.55 - 1.02 mg/dL    Calcium Level Total 9.2 8.4 - 10.2 mg/dL    Protein Total 6.6 6.4 - 8.3 gm/dL    Albumin Level 3.7 3.5 - 5.0 g/dL    Globulin 2.9 2.4 - 3.5 gm/dL    Albumin/Globulin Ratio 1.3 1.1 - 2.0 ratio    Bilirubin Total 0.6 <=1.5 mg/dL    Alkaline Phosphatase 108 40 - 150 unit/L    Alanine Aminotransferase 19 0 - 55 unit/L    Aspartate Aminotransferase 28 5 - 34 unit/L    eGFR >60 mls/min/1.73/m2   TSH    Collection Time: 01/02/24  7:48 AM   Result Value Ref Range    TSH 0.429 0.350 - 4.940 uIU/mL   Lipid Panel    Collection Time: 01/02/24  7:48 AM   Result Value Ref Range    Cholesterol Total 143  "<=200 mg/dL    HDL Cholesterol 65 (H) 35 - 60 mg/dL    Triglyceride 87 37 - 140 mg/dL    Cholesterol/HDL Ratio 2 0 - 5    Very Low Density Lipoprotein 17     LDL Cholesterol 61.00 50.00 - 140.00 mg/dL   CBC with Differential    Collection Time: 01/02/24  7:48 AM   Result Value Ref Range    WBC 7.18 4.50 - 11.50 x10(3)/mcL    RBC 5.08 4.20 - 5.40 x10(6)/mcL    Hgb 14.6 12.0 - 16.0 g/dL    Hct 45.4 37.0 - 47.0 %    MCV 89.4 80.0 - 94.0 fL    MCH 28.7 27.0 - 31.0 pg    MCHC 32.2 (L) 33.0 - 36.0 g/dL    RDW 15.9 11.5 - 17.0 %    Platelet 324 130 - 400 x10(3)/mcL    MPV 10.1 7.4 - 10.4 fL    Neut % 67.4 %    Lymph % 20.2 %    Mono % 11.1 %    Eos % 0.4 %    Basophil % 0.6 %    Lymph # 1.45 0.6 - 4.6 x10(3)/mcL    Neut # 4.84 2.1 - 9.2 x10(3)/mcL    Mono # 0.80 0.1 - 1.3 x10(3)/mcL    Eos # 0.03 0 - 0.9 x10(3)/mcL    Baso # 0.04 <=0.2 x10(3)/mcL    IG# 0.02 0 - 0.04 x10(3)/mcL    IG% 0.3 %    NRBC% 0.0 %      No results found for: "PHENYTOIN", "PHENOBARB", "VALPROATE", "CBMZ"        Psychiatric Mental Status Exam:  General Appearance: dressed in hospital garb, overweight  Arousal: obtunded  Behavior: minimal responses  Movements and Motor Activity: no abnormal involuntary movements noted  Orientation:  Not oriented  Speech:  Minimal  Mood:  Unable to assess          Reason for Admission:  The patient poses a significant and immediate danger to self.      Will transfer patient to Ed for medical clearance    On this date, I have reviewed the medical history and Nursing Assessment, as well as records from referral source.  I have evaluated the mental status of the above named person and concur with the findings of all assessments.  I have provided medical direction for the development of the Treatment Plan.    I conclude that this patient meets admission criteria for inpatient treatment.  I certify that this patient poses a danger to self or others, or would otherwise be considered gravely disabled based on this assessment " and/or provided collateral information.     I have provided medical direction for the development of the Treatment plan.  These services will be provided while this patient is under my care and will be based on an individualized plan of care.  The patient can demonstrate a reasonable expectation of improvement in his/her disorder as a result of the active treatment being provided.      Os[facility] Wolf [facility]

## 2024-01-02 NOTE — ED NOTES
Spoke with Jazmine @ [facility]. Pt has been accepted to [facility] in [place] by Dr. Higuera and Deandre Perea NP. Pt has to be monitored here until 2230 and then pt can be transferred. Number for report is [phone].

## 2024-01-02 NOTE — NURSING
Patient was on the floor in the bathroom. Staff to room to assess, vitals: 120/77, 99, T. 98, O2 sat 99%.  Not responsive to verbal stimuli, when questions asked answers are unintelligible.    Nurse practitioner  in  office, called to assess patient.  On assessment, toilet paper found stuffed tightly in patient's mouth.  Paper removed by NP and sternal rub done to arouse patient.  1005--EMS Called to  patient.  1016- Patient loaded on EMS stretcher and placed in waiting ambulance

## 2024-01-02 NOTE — ED PROVIDER NOTES
Encounter Date: 1/2/2024    SCRIBE #1 NOTE: I, Yadira Villalpando, am scribing for, and in the presence of,  Jamie Feliz III, MD. I have scribed the following portions of the note - Other sections scribed: HPI, ROS, PE, MDM.       History     Chief Complaint   Patient presents with    Suicide Attempt     Arrives via EMS from [facility] for medical clearance after suicide attempt. PEC'd yesterday after overdose attempt. Was found in bathroom with toilet paper in mouth. Nurses stated patient was unresponsive. Initial GCS 14 on EMS arrival - currently GCS 15.     48 year old female with a history of fibromyalgia presents to ED, via EMS, from a behavioral health facility.  A nurse from the facility, at bedside, says that the patient was moving from beds and was found unresponsive with toilet paper in the her mouth.  The facility wants medical clearance.  She is currently under a PEC from [facility] yesterday. The patient says she does not remember the incident.     The history is provided by the patient and a caregiver. No  was used.     Review of patient's allergies indicates:  No Known Allergies  No past medical history on file.  No past surgical history on file.  No family history on file.  Social History     Tobacco Use    Smoking status: Every Day     Types: Cigarettes    Smokeless tobacco: Never   Substance Use Topics    Alcohol use: Not Currently    Drug use: Not Currently     Comment: unknown     Review of Systems   Constitutional:  Negative for fatigue, fever and unexpected weight change.   HENT:  Negative for congestion and rhinorrhea.    Eyes:  Negative for pain.   Respiratory:  Negative for chest tightness, shortness of breath and wheezing.    Cardiovascular:  Negative for chest pain.   Gastrointestinal:  Negative for abdominal pain, constipation, diarrhea, nausea and vomiting.   Genitourinary:  Negative for dysuria.   Musculoskeletal:  Negative for back pain and neck pain.   Skin:   Negative for rash.   Allergic/Immunologic: Negative for environmental allergies, food allergies and immunocompromised state.   Neurological:  Negative for dizziness and speech difficulty.   Hematological:  Does not bruise/bleed easily.   Psychiatric/Behavioral:  Negative for sleep disturbance and suicidal ideas.        Physical Exam     Initial Vitals [01/02/24 1042]   BP Pulse Resp Temp SpO2   (!) 158/75 90 18 97.5 °F (36.4 °C) 98 %      MAP       --         Physical Exam    Nursing note and vitals reviewed.  Constitutional: No distress.   HENT:   Head: Normocephalic and atraumatic.   Mouth/Throat: Oropharynx is clear and moist.   Poor dentition    Neck: Trachea normal.   Cardiovascular:  Normal rate and regular rhythm.           No murmur heard.  Pulmonary/Chest: Breath sounds normal. No respiratory distress.   Abdominal: Abdomen is soft. Bowel sounds are normal. She exhibits no distension. There is no abdominal tenderness.   Musculoskeletal:         General: Normal range of motion.      Lumbar back: Normal range of motion.     Neurological: She is alert and oriented to person, place, and time. She has normal strength. No cranial nerve deficit.   Skin: Skin is warm and dry. No rash noted.   Psychiatric:   tearful         ED Course   Procedures  Labs Reviewed - No data to display       Imaging Results              X-Ray Chest 1 View (Final result)  Result time 01/02/24 11:43:31      Final result by Michael Siddiqui MD (01/02/24 11:43:31)                   Impression:      No acute findings.      Electronically signed by: Michael Siddiqui  Date:    01/02/2024  Time:    11:43               Narrative:    EXAMINATION:  XR CHEST 1 VIEW    CLINICAL HISTORY:  aspiratrion;    COMPARISON:  No priors    FINDINGS:  Frontal view of the chest was obtained. The heart is mildly enlarged.  The lungs are grossly clear.  There is no pneumothorax.                                       Medications - No data to display  Medical Decision  Making  Differential diagnosis includes, but is not limited to OD, aspiration.      Notes from yesterday patient was medically cleared by Toxicology standard aspirin acetaminophen drug screen did not reveal any abnormalities patient no access to medication or pills per the wrap from [facility] you that is with her they are just worried because she stuff toilet paper in her mouth her oropharynx is clear her lungs are clear her chest x-rays without abnormality vital signs stable no concern for aspiration patient discharged back to mental health facility    Problems Addressed:  Suicide attempt: acute illness or injury    Amount and/or Complexity of Data Reviewed  Radiology: ordered.            Scribe Attestation:   Scribe #1: I performed the above scribed service and the documentation accurately describes the services I performed. I attest to the accuracy of the note.    Attending Attestation:           Physician Attestation for Scribe:  Physician Attestation Statement for Scribe #1: I, Jamie Feliz III, MD, reviewed documentation, as scribed by Yadira Villalpando in my presence, and it is both accurate and complete.                                    Clinical Impression:  Final diagnoses:  [T14.91XA] Suicide attempt (Primary)          ED Disposition Condition    Discharge Stable          ED Prescriptions    None       Follow-up Information    None          Jamie Fleiz III, MD  01/02/24 4201

## 2024-01-02 NOTE — NURSING
"Admission Note:    Jessa Elizalde is a 48 y.o. female, : 1975, MRN: [id number], admitted on 2024 to [facility] ([facility]) for Mohamud Vale MD with a diagnosis of No admission diagnoses are documented for this encounter.. Patient admitted on a status of Physician Emergency Certificate (PEC). Jessa reports no known food or drug allergies.    Patient demonstrated an affect that was flat. Patient demonstrated mood during assessment that was depressed. Patient had an appearance that was disheveled and poor hygiene.  Patient denies suicidal ideation. Patient denies suicide plan. Patient denies hallucinations.  Patient is very lethargic upon admission and unable to sign consents or answer most questions appropriately.     Lorraines  height is 4' 9" (1.448 m) and weight is 74.8 kg (165 lb). Her oral temperature is 98.2 °F (36.8 °C). Her blood pressure is 118/75 and her pulse is 78. Her respiration is 16 and oxygen saturation is 98%.     Lorraines last BM was noted on: _______    Metal detector screening performed via security personnel. The result of the scan was negative. Head-to-toe physical assessment completed with the following findings:  No contraband found upon body screen. A full skin assessment was performed. Lilliana skin appeared with multiple scabbed areas to bilateral upper extremities due to dog bites and scratches.  Jessa was oriented to unit, staff, peers, and room. Patient belongings/valuables stored in locked intake room cabinet and changes of clothing provided to patient. Jessa was placed on Q 15 min observations.      "

## 2024-01-02 NOTE — ED NOTES
Spoke with ALENA to arrange pt transport to [facility] for 2230. All questions answered. ALENA will call back with ASHU

## 2024-01-02 NOTE — PLAN OF CARE
Problem: Adult Inpatient Plan of Care  Goal: Plan of Care Review  Outcome: Ongoing, Not Progressing  Goal: Patient-Specific Goal (Individualized)  Outcome: Ongoing, Not Progressing  Goal: Absence of Hospital-Acquired Illness or Injury  Outcome: Ongoing, Not Progressing  Goal: Optimal Comfort and Wellbeing  Outcome: Ongoing, Not Progressing  Goal: Readiness for Transition of Care  Outcome: Ongoing, Not Progressing     Problem: Fall Injury Risk  Goal: Absence of Fall and Fall-Related Injury  Outcome: Ongoing, Not Progressing     Problem: Skin Injury Risk Increased  Goal: Skin Health and Integrity  Outcome: Ongoing, Not Progressing     Problem: Violence Risk or Actual  Goal: Anger and Impulse Control  Outcome: Ongoing, Not Progressing     Problem: Activity and Energy Impairment (Depressive Signs/Symptoms)  Goal: Optimized Energy Level (Depressive Signs/Symptoms)  Outcome: Ongoing, Not Progressing     Problem: Cognitive Impairment (Depressive Signs/Symptoms)  Goal: Optimized Cognitive Function  Outcome: Ongoing, Not Progressing     Problem: Decreased Participation/Engagement (Depressive Signs/Symptoms)  Goal: Increased Participation and Engagement (Depressive Signs/Symptoms)  Outcome: Ongoing, Not Progressing     Problem: Feelings of Worthlessness, Hopelessness or Excessive Guilt (Depressive Signs/Symptoms)  Goal: Enhanced Self-Esteem and Confidence (Depressive Signs/Symptoms)  Outcome: Ongoing, Not Progressing     Problem: Mood Impairment (Depressive Signs/Symptoms)  Goal: Improved Mood Symptoms (Depressive Signs/Symptoms)  Outcome: Ongoing, Not Progressing     Problem: Nutrition Imbalance (Depressive Signs/Symptoms)  Goal: Optimized Nutrition Intake  Outcome: Ongoing, Not Progressing     Problem: Psychomotor Impairment (Depressive Signs/Symptoms)  Goal: Improved Psychomotor Symptoms (Depressive Signs/Symptoms)  Outcome: Ongoing, Not Progressing     Problem: Sleep Disturbance (Depressive Signs/Symptoms)  Goal:  Improved Sleep (Depressive Signs/Symptoms)  Outcome: Ongoing, Not Progressing     Problem: Social, Occupational or Functional Impairment (Depressive Signs/Symptoms)  Goal: Enhanced Social, Occupational or Functional Skills (Depressive Signs/Symptoms)  Outcome: Ongoing, Not Progressing

## 2024-01-02 NOTE — NURSING
Patient returned to [facility] from [facility] for medical clearance:    Jessa Elizalde is a 48 y.o. female placed under a PEC at [place] after taking approximately 15 benazepril tablets and about 10 lorazepam tablets. Prior to doing an evaluation patient was found unresponsive in the bathroom with her mouth stuffed with tissue paper. Nurse Practitioner removed the tissue paper and was able to arouse the patient with manual stimulation. Patient was not oriented to person or place .

## 2024-01-02 NOTE — CARE UPDATE
Following pt SA via tissue in mouth. Sw attempted to contact pt mother, Sharmila [phone], to notify her pt is being sent to [facility] for medical clearance. No answer and phone just rung.

## 2024-01-02 NOTE — PLAN OF CARE
Problem: Adult Inpatient Plan of Care  Goal: Plan of Care Review  Outcome: Ongoing, Progressing  Goal: Patient-Specific Goal (Individualized)  Outcome: Ongoing, Progressing  Goal: Absence of Hospital-Acquired Illness or Injury  Outcome: Ongoing, Progressing  Goal: Optimal Comfort and Wellbeing  Outcome: Ongoing, Progressing  Goal: Readiness for Transition of Care  Outcome: Ongoing, Progressing     Problem: Violence Risk or Actual  Goal: Anger and Impulse Control  Outcome: Ongoing, Progressing     Problem: Skin Injury Risk Increased  Goal: Skin Health and Integrity  Outcome: Ongoing, Progressing     Problem: Cognitive Impairment (Depressive Signs/Symptoms)  Goal: Optimized Cognitive Function  Outcome: Ongoing, Progressing     Problem: Decreased Participation/Engagement (Depressive Signs/Symptoms)  Goal: Increased Participation and Engagement (Depressive Signs/Symptoms)  Outcome: Ongoing, Progressing     Problem: Feelings of Worthlessness, Hopelessness or Excessive Guilt (Depressive Signs/Symptoms)  Goal: Enhanced Self-Esteem and Confidence (Depressive Signs/Symptoms)  Outcome: Ongoing, Progressing     Problem: Mood Impairment (Depressive Signs/Symptoms)  Goal: Improved Mood Symptoms (Depressive Signs/Symptoms)  Outcome: Ongoing, Progressing

## 2024-01-03 PROBLEM — F33.2 MAJOR DEPRESSIVE DISORDER, RECURRENT, SEVERE WITHOUT PSYCHOTIC FEATURES: Status: ACTIVE | Noted: 2024-01-03

## 2024-01-03 PROBLEM — I10 HTN (HYPERTENSION): Status: ACTIVE | Noted: 2024-01-03

## 2024-01-03 PROBLEM — F41.1 GENERALIZED ANXIETY DISORDER: Status: ACTIVE | Noted: 2024-01-03

## 2024-01-03 PROBLEM — E78.5 HYPERLIPIDEMIA: Status: ACTIVE | Noted: 2024-01-03

## 2024-01-03 PROCEDURE — 25000003 PHARM REV CODE 250: Performed by: FAMILY MEDICINE

## 2024-01-03 PROCEDURE — 11400000 HC PSYCH PRIVATE ROOM

## 2024-01-03 PROCEDURE — 25000003 PHARM REV CODE 250: Performed by: PSYCHIATRY & NEUROLOGY

## 2024-01-03 RX ORDER — ATORVASTATIN CALCIUM 10 MG/1
40 TABLET, FILM COATED ORAL DAILY
Status: DISCONTINUED | OUTPATIENT
Start: 2024-01-03 | End: 2024-01-09 | Stop reason: HOSPADM

## 2024-01-03 RX ORDER — HYDROXYZINE HYDROCHLORIDE 25 MG/1
25 TABLET, FILM COATED ORAL 2 TIMES DAILY
Status: DISCONTINUED | OUTPATIENT
Start: 2024-01-03 | End: 2024-01-09 | Stop reason: HOSPADM

## 2024-01-03 RX ORDER — LISINOPRIL 10 MG/1
10 TABLET ORAL
Status: DISCONTINUED | OUTPATIENT
Start: 2024-01-04 | End: 2024-01-09 | Stop reason: HOSPADM

## 2024-01-03 RX ORDER — PREGABALIN 25 MG/1
75 CAPSULE ORAL 2 TIMES DAILY
Status: DISCONTINUED | OUTPATIENT
Start: 2024-01-03 | End: 2024-01-09 | Stop reason: HOSPADM

## 2024-01-03 RX ORDER — ESCITALOPRAM OXALATE 10 MG/1
20 TABLET ORAL DAILY
Status: DISCONTINUED | OUTPATIENT
Start: 2024-01-03 | End: 2024-01-09 | Stop reason: HOSPADM

## 2024-01-03 RX ADMIN — PREGABALIN 75 MG: 25 CAPSULE ORAL at 08:01

## 2024-01-03 RX ADMIN — ESCITALOPRAM OXALATE 20 MG: 10 TABLET ORAL at 09:01

## 2024-01-03 RX ADMIN — HYDROXYZINE HYDROCHLORIDE 25 MG: 25 TABLET, FILM COATED ORAL at 09:01

## 2024-01-03 RX ADMIN — PREGABALIN 75 MG: 25 CAPSULE ORAL at 09:01

## 2024-01-03 RX ADMIN — HYDROXYZINE HYDROCHLORIDE 25 MG: 25 TABLET, FILM COATED ORAL at 08:01

## 2024-01-03 RX ADMIN — DILTIAZEM HYDROCHLORIDE 120 MG: 120 CAPSULE, COATED, EXTENDED RELEASE ORAL at 08:01

## 2024-01-03 RX ADMIN — ATORVASTATIN CALCIUM 40 MG: 10 TABLET, FILM COATED ORAL at 02:01

## 2024-01-03 NOTE — NURSING
Treatment Team Note:      Behavior:    Patient (Jessa Elizalde is a 48 y.o. female, : 1975, MRN: [id number]) demonstrating an affect that was sad, flat, tearful, and anxious. Jessa demonstrating mood that is depressed and anxious. Jessa had an appearance that was clean. Jessa denies suicidal ideation. Jessa denies suicide plan. Jessa denies hallucinations.      Intervention:    Encourage Jessa to perform self-hygiene, grooming, and changing of clothing. Encourage Jessa to attend all scheduled groups. Monitor Jessa's behavior and program compliance. Monitor Jessa for suicidal ideation, homicidal ideation, sleep disturbance, and hallucinations. Encourage Jessa to eat all portions of meals and assess for meal preferences. Monitor Jessa for intake and output to ensure hydration. Notify the Physician/Physician Assistant/Advance Practice Registered Nurse (MD/PA/APRN) for any medication refusal and any change in patient condition.    Discussed with Jessa course of treatment. Discussed with Jessa medications ordered and schedule of medications. Discussed collateral contact with Jessa.      Response:    Jessa's response to treatment team meeting: cooperative. States she doesn't remember the suicide attempt yesterday when she was on unit. She had stuffed a large amount of toilet paper in mouth and down throat. She stated she only remembers being in the ER.      Plan:     Continue to monitor per MD/PA/APRN orders; maintain patient safety.
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
## 2024-01-03 NOTE — DISCHARGE SUMMARY
DISCHARGE SUMMARY  PSYCHIATRY      Admit Date: 1/2/2024  1:08 AM    Discharge Date:  1/2/2024    SITE:   [facility]    Discharge Attending Physician: Mohamud Vale M.D.    Chief Complaint:  Suicide attempt     History of Present Illness On Admit:   Jessa Elizalde is a 48 y.o. female placed under a PEC at [place] after taking approximately 15 benazepril tablets and about 10 lorazepam tablets. Prior to doing an evaluation patient was found unresponsive in the bathroom with her mouth stuffed with tissue paper. I removed the tissue paper and was able to arouse the patient with manual stimulation. Patient was not oriented to person or place. She did speak to me and staff. EMS was contacted to transport patient to the ED for medical clearance Vital signs were checked and stable. Pulse was 99 and palpable. No irregularities felt. No medications were started. Will have the patient evaluated in the ED and begin treatment for depression once patient returns.      The below information where available was pulled from previous records.       Admit Mental Status Exam:  General Appearance: dressed in hospital garb, overweight  Arousal: obtunded  Behavior: minimal responses  Movements and Motor Activity: no abnormal involuntary movements noted  Orientation:  Not oriented  Speech:  Minimal  Mood:  Unable to assess      Diagnoses:  PRINCIPAL PROBLEM:  Major Depressive Disorder, recurrent, severe (F33.2)      PROBLEM LIST  Major Depressive Disorder, recurrent, severe (F33.2)  Generalized Anxiety Disorder (F41.1)      Hospital Course:   Patient had been admitted to [facility] but was transferred to the ED after the above events.      Current Medications:   Scheduled Meds:        DISCHARGE EXAMINATION    VITALS   Vitals:    01/02/24 0100 01/02/24 0701   BP: 118/75 126/87   BP Location: Right arm Left arm   Patient Position: Lying Sitting   Pulse: 78 93   Resp: 16 18   Temp: 98.2 °F  "(36.8 °C) 98.3 °F (36.8 °C)   TempSrc: Oral Oral   SpO2: 98% 97%   Weight: 74.8 kg (165 lb)    Height: 4' 9" (1.448 m)          Discharge Mental Status Exam:  General Appearance: dressed in hospital garb, overweight  Arousal: obtunded  Behavior: minimal responses  Movements and Motor Activity: no abnormal involuntary movements noted  Orientation:  Not oriented  Speech:  Minimal  Mood:  Unable to assess      Discharge Condition:  In need of acute medical attention    Prognosis:  Guarded    Justification for multiple antipsychotics:  N/a    Disposition:  Discharged to ED    Follow-up:  Per ED      Medication Regimen:  No current facility-administered medications for this encounter.  No current outpatient medications on file.    Facility-Administered Medications Ordered in Other Encounters:     acetaminophen tablet 650 mg, 650 mg, Oral, Q6H PRN, Mohamud Vale MD    aluminum-magnesium hydroxide-simethicone 200-200-20 mg/5 mL suspension 30 mL, 30 mL, Oral, Q6H PRN, Mohamud Vale MD    diltiaZEM 24 hr capsule 120 mg, 120 mg, Oral, Daily, Mohamud Vale MD    haloperidoL tablet 10 mg, 10 mg, Oral, Q4H PRN **AND** diphenhydrAMINE capsule 50 mg, 50 mg, Oral, Q4H PRN **AND** LORazepam tablet 2 mg, 2 mg, Oral, Q4H PRN **AND** haloperidol lactate injection 10 mg, 10 mg, Intramuscular, Q4H PRN **AND** diphenhydrAMINE injection 50 mg, 50 mg, Intramuscular, Q4H PRN **AND** LORazepam injection 2 mg, 2 mg, Intramuscular, Q4H PRN, Mohamud Vale MD    hydrOXYzine tablet 50 mg, 50 mg, Oral, Q4H PRN, Mohamud Vale MD    nicotine 21 mg/24 hr 1 patch, 1 patch, Transdermal, Daily, Mohamud Vale MD    traZODone tablet 100 mg, 100 mg, Oral, Nightly PRN, Mohamud Vale MD      Patient Instructions:   Continue medication regimen as prescribed.    Disposition plan per  - see  notes for details.    Patient instructed to call 911 or present to emergency " department if any of the following complications develop status post discharge: suicidality, homicidality, or grave disability.     Total time spent discharging patient: <30 minutes      Mohamud Vale M.D.

## 2024-01-03 NOTE — H&P
[facility]  History & Physical    Subjective:      No chief complaint on file.       HPI:  Jessa Elizalde is a 48 y.o. female.    I got upset with my  and ex- and said some mean things. I made a bad decisions and took about 35 tablets of Lisinopril and 5 Xanax. I was wanted to go to bed and get things over with until I woke up the next day.  I'm going through menopause and my emotions have been all over the place and I have some depression and anxiety.  Denies SI.  I take Lexapro every day for about two years for depression.  Denies AH or VH or street drugs.      Past Medical History:   Diagnosis Date    Hyperlipidemia     Hypertension      Past Surgical History:   Procedure Laterality Date    BILATERAL TUBAL LIGATION  08/03/2003     Family History   Problem Relation Age of Onset    Coronary artery disease Mother         5vCABG @ 44 y/o    Heart attacks under age 50 Mother     Hyperlipidemia Mother     Hypertension Mother     Lung cancer Father      Social History     Tobacco Use    Smoking status: Never    Smokeless tobacco: Never   Substance Use Topics    Alcohol use: Not Currently    Drug use: Never     Comment: unknown       PTA Medications   Medication Sig    ALPRAZolam (XANAX) 0.5 MG tablet Take 0.5 mg by mouth 2 (two) times daily as needed.    lisinopriL 10 MG tablet Take 10 mg by mouth.    cephALEXin (KEFLEX) 500 MG capsule Take 500 mg by mouth 4 (four) times daily.    doxycycline (VIBRAMYCIN) 100 MG Cap Take 100 mg by mouth 2 (two) times daily.    EScitalopram oxalate (LEXAPRO) 10 MG tablet Take 10 mg by mouth.    EScitalopram oxalate (LEXAPRO) 20 MG tablet Take 20 mg by mouth.    hydrOXYzine pamoate (VISTARIL) 25 MG Cap Take 25 mg by mouth every evening.    pramipexole (MIRAPEX) 0.25 MG tablet Take 0.25 mg by mouth every evening.    pregabalin (LYRICA) 75 MG capsule Take 75 mg by mouth 2 (two) times daily.    rosuvastatin (CRESTOR) 10 MG  tablet Take 10 mg by mouth.     Review of patient's allergies indicates:   Allergen Reactions    Milk containing products (dairy)      But can eat cheeses and ice cream with no problems    Sulfa (sulfonamide antibiotics)         Review of Systems   Constitutional: Negative.    HENT: Negative.     Respiratory: Negative.     Cardiovascular: Negative.    Gastrointestinal: Negative.    Genitourinary: Negative.    Musculoskeletal: Negative.    Skin:  Positive for rash (Neck as discribed).   Neurological: Negative.    Endo/Heme/Allergies: Negative.        Objective:      Vital Signs (Most Recent)  Temp: 98 °F (36.7 °C) (01/03/24 0701)  Pulse: 98 (01/03/24 0701)  Resp: 18 (01/03/24 0701)  BP: 111/78 (01/03/24 0701)  SpO2: 96 % (01/03/24 0701)    Vital Signs Range (Last 24H):  Temp:  [97.2 °F (36.2 °C)-98 °F (36.7 °C)]   Pulse:  [73-98]   Resp:  [18-20]   BP: (111-127)/(76-81)   SpO2:  [96 %-100 %]     Physical Exam  Vitals reviewed. Exam conducted with a chaperone present.   HENT:      Head: Normocephalic.      Nose: Nose normal.      Mouth/Throat:      Mouth: Mucous membranes are moist.      Dentition: Abnormal dentition (several missing teeth). Dental caries present.      Pharynx: Oropharynx is clear.   Eyes:      Extraocular Movements: Extraocular movements intact.      Pupils: Pupils are equal, round, and reactive to light.   Cardiovascular:      Rate and Rhythm: Normal rate and regular rhythm.   Pulmonary:      Effort: Pulmonary effort is normal.      Breath sounds: Normal breath sounds.   Abdominal:      General: Abdomen is flat. Bowel sounds are normal.      Palpations: Abdomen is soft.   Musculoskeletal:         General: Normal range of motion.      Cervical back: Normal range of motion and neck supple.   Skin:     General: Skin is warm and dry.             Comments: Healed eschar from scarring self and mild hyperpigmentation.  Three tattoos.   Neurological:      General: No focal deficit present.      Mental  Status: She is alert.         Data Review:    Recent Results (from the past 48 hour(s))   COVID-19 Rapid Screening    Collection Time: 01/01/24  4:18 PM   Result Value Ref Range    SARS COV-2 MOLECULAR Negative Negative   Comprehensive metabolic panel    Collection Time: 01/01/24  4:35 PM   Result Value Ref Range    Sodium Level 140 136 - 145 mmol/L    Potassium Level 3.9 3.5 - 5.1 mmol/L    Chloride 106 98 - 107 mmol/L    Carbon Dioxide 24 22 - 29 mmol/L    Glucose Level 126 (H) 74 - 100 mg/dL    Blood Urea Nitrogen 11.0 9.8 - 20.1 mg/dL    Creatinine 0.81 0.55 - 1.02 mg/dL    Calcium Level Total 9.9 8.4 - 10.2 mg/dL    Protein Total 7.9 6.4 - 8.3 gm/dL    Albumin Level 4.1 3.5 - 5.0 g/dL    Globulin 3.8 (H) 2.4 - 3.5 gm/dL    Albumin/Globulin Ratio 1.1 1.1 - 2.0 ratio    Bilirubin Total 0.7 <=1.5 mg/dL    Alkaline Phosphatase 117 40 - 150 unit/L    Alanine Aminotransferase 22 0 - 55 unit/L    Aspartate Aminotransferase 28 5 - 34 unit/L    eGFR >60 mls/min/1.73/m2   TSH    Collection Time: 01/01/24  4:35 PM   Result Value Ref Range    TSH 0.606 0.350 - 4.940 uIU/mL   Ethanol    Collection Time: 01/01/24  4:35 PM   Result Value Ref Range    Ethanol Level <10.0 <=10.0 mg/dL   Acetaminophen level    Collection Time: 01/01/24  4:35 PM   Result Value Ref Range    Acetaminophen Level <17.4 (L) 17.4 - 30.0 ug/ml   CBC with Differential    Collection Time: 01/01/24  4:35 PM   Result Value Ref Range    WBC 5.84 4.50 - 11.50 x10(3)/mcL    RBC 5.46 (H) 4.20 - 5.40 x10(6)/mcL    Hgb 15.6 12.0 - 16.0 g/dL    Hct 48.3 (H) 37.0 - 47.0 %    MCV 88.5 80.0 - 94.0 fL    MCH 28.6 27.0 - 31.0 pg    MCHC 32.3 (L) 33.0 - 36.0 g/dL    RDW 16.0 11.5 - 17.0 %    Platelet 354 130 - 400 x10(3)/mcL    MPV 9.5 7.4 - 10.4 fL    Neut % 68.5 %    Lymph % 24.5 %    Mono % 6.0 %    Eos % 0.2 %    Basophil % 0.5 %    Lymph # 1.43 0.6 - 4.6 x10(3)/mcL    Neut # 4.00 2.1 - 9.2 x10(3)/mcL    Mono # 0.35 0.1 - 1.3 x10(3)/mcL    Eos # 0.01 0 - 0.9  x10(3)/mcL    Baso # 0.03 <=0.2 x10(3)/mcL    IG# 0.02 0 - 0.04 x10(3)/[facility]    IG% 0.3 %    NRBC% 0.0 %   Urinalysis, Reflex to Urine Culture    Collection Time: 01/01/24  5:15 PM    Specimen: Urine   Result Value Ref Range    Color, UA Yellow Yellow, Light-Yellow, Dark Yellow, Lilia, Straw    Appearance, UA Clear Clear    Specific Gravity, UA 1.025 1.005 - 1.030    pH, UA 6.0 5.0 - 8.5    Protein, UA Negative Negative    Glucose, UA Negative Negative, Normal    Ketones, UA Negative Negative    Blood, UA Negative Negative    Bilirubin, UA Negative Negative    Urobilinogen, UA 0.2 0.2, 1.0, Normal    Nitrites, UA Negative Negative    Leukocyte Esterase, UA Negative Negative   Drug Screen, Urine    Collection Time: 01/01/24  5:15 PM   Result Value Ref Range    Amphetamines, Urine Negative Negative    Barbituates, Urine Negative Negative    Benzodiazepine, Urine Positive (A) Negative    Cannabinoids, Urine Negative Negative    Cocaine, Urine Negative Negative    Fentanyl, Urine Negative Negative    MDMA, Urine Negative Negative    Opiates, Urine Negative Negative    Phencyclidine, Urine Negative Negative    pH, Urine 6.0 3.0 - 11.0    Specific Gravity, Urine Auto 1.025 1.001 - 1.035   Pregnancy, urine rapid    Collection Time: 01/01/24  5:15 PM   Result Value Ref Range    Beta hCG Qualitative, Urine Negative Negative   Hemoglobin A1C    Collection Time: 01/02/24  7:48 AM   Result Value Ref Range    Hemoglobin A1c 5.8 <=7.0 %    Estimated Average Glucose 119.8 mg/dL   Comprehensive Metabolic Panel    Collection Time: 01/02/24  7:48 AM   Result Value Ref Range    Sodium Level 143 136 - 145 mmol/L    Potassium Level 4.4 3.5 - 5.1 mmol/L    Chloride 108 (H) 98 - 107 mmol/L    Carbon Dioxide 24 22 - 29 mmol/L    Glucose Level 91 74 - 100 mg/dL    Blood Urea Nitrogen 11.2 9.8 - 20.1 mg/dL    Creatinine 0.68 0.55 - 1.02 mg/dL    Calcium Level Total 9.2 8.4 - 10.2 mg/dL    Protein Total 6.6 6.4 - 8.3 gm/dL    Albumin Level 3.7  3.5 - 5.0 g/dL    Globulin 2.9 2.4 - 3.5 gm/dL    Albumin/Globulin Ratio 1.3 1.1 - 2.0 ratio    Bilirubin Total 0.6 <=1.5 mg/dL    Alkaline Phosphatase 108 40 - 150 unit/L    Alanine Aminotransferase 19 0 - 55 unit/L    Aspartate Aminotransferase 28 5 - 34 unit/L    eGFR >60 mls/min/1.73/m2   TSH    Collection Time: 01/02/24  7:48 AM   Result Value Ref Range    TSH 0.429 0.350 - 4.940 uIU/mL   Lipid Panel    Collection Time: 01/02/24  7:48 AM   Result Value Ref Range    Cholesterol Total 143 <=200 mg/dL    HDL Cholesterol 65 (H) 35 - 60 mg/dL    Triglyceride 87 37 - 140 mg/dL    Cholesterol/HDL Ratio 2 0 - 5    Very Low Density Lipoprotein 17     LDL Cholesterol 61.00 50.00 - 140.00 mg/dL   SYPHILIS ANTIBODY (WITH REFLEX RPR)    Collection Time: 01/02/24  7:48 AM   Result Value Ref Range    Syphilis Antibody Nonreactive Nonreactive, Equivocal   CBC with Differential    Collection Time: 01/02/24  7:48 AM   Result Value Ref Range    WBC 7.18 4.50 - 11.50 x10(3)/mcL    RBC 5.08 4.20 - 5.40 x10(6)/mcL    Hgb 14.6 12.0 - 16.0 g/dL    Hct 45.4 37.0 - 47.0 %    MCV 89.4 80.0 - 94.0 fL    MCH 28.7 27.0 - 31.0 pg    MCHC 32.2 (L) 33.0 - 36.0 g/dL    RDW 15.9 11.5 - 17.0 %    Platelet 324 130 - 400 x10(3)/mcL    MPV 10.1 7.4 - 10.4 fL    Neut % 67.4 %    Lymph % 20.2 %    Mono % 11.1 %    Eos % 0.4 %    Basophil % 0.6 %    Lymph # 1.45 0.6 - 4.6 x10(3)/mcL    Neut # 4.84 2.1 - 9.2 x10(3)/mcL    Mono # 0.80 0.1 - 1.3 x10(3)/mcL    Eos # 0.03 0 - 0.9 x10(3)/mcL    Baso # 0.04 <=0.2 x10(3)/mcL    IG# 0.02 0 - 0.04 x10(3)/mcL    IG% 0.3 %    NRBC% 0.0 %     ECG:   Results for orders placed or performed during the hospital encounter of 01/02/24   EKG 12-lead    Collection Time: 01/02/24 12:10 PM    Narrative    Test Reason : R55,    Vent. Rate : 082 BPM     Atrial Rate : 082 BPM     P-R Int : 198 ms          QRS Dur : 076 ms      QT Int : 386 ms       P-R-T Axes : 060 002 018 degrees     QTc Int : 450 ms    Normal sinus  rhythm  Normal EKG  Confirmed by Wilfredo Velasquez MD [id number]) on 1/2/2024 2:39:45 PM    Referred By: [email or web address]   SELF           Confirmed By:Wilfredo Velasquez MD      IMAGING: X-Ray Chest 1 View    Result Date: 1/2/2024  EXAMINATION: XR CHEST 1 VIEW CLINICAL HISTORY: aspiratrion; COMPARISON: No priors FINDINGS: Frontal view of the chest was obtained. The heart is mildly enlarged.  The lungs are grossly clear.  There is no pneumothorax.     No acute findings. Electronically signed by: Michael Siddiqui Date:    01/02/2024 Time:    11:43       Assessment:      Active Hospital Problems    Diagnosis  POA    *Major depressive disorder, recurrent, severe without psychotic features [F33.2]  Yes    Generalized anxiety disorder [F41.1]  Yes    HTN (hypertension) [I10]  Yes    Hyperlipidemia [E78.5]  Yes      Resolved Hospital Problems   No resolved problems to display.       Plan:    Resume Lisinopril and Crestor to Lipitor due to formulary restriction.  Plan per psychiatrist

## 2024-01-03 NOTE — PLAN OF CARE
Problem: Adult Inpatient Plan of Care  Goal: Plan of Care Review  Outcome: Ongoing, Not Progressing  Goal: Patient-Specific Goal (Individualized)  Outcome: Ongoing, Not Progressing  Goal: Absence of Hospital-Acquired Illness or Injury  Outcome: Ongoing, Not Progressing  Goal: Optimal Comfort and Wellbeing  Outcome: Ongoing, Not Progressing

## 2024-01-03 NOTE — HPI
I got upset with my  and ex- and said some mean things. I made a bad decisions and took about 35 tablets of Lisinopril and 5 Xanax. I was wanted to go to bed and get things over with until I woke up the next day.  I'm going through menopause and my emotions have been all over the place and I have some depression and anxiety.  Denies SI.  I take Lexapro every day for about two years for depression.  Denies AH or  or street drugs.
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
## 2024-01-03 NOTE — PLAN OF CARE
Problem: Adult Inpatient Plan of Care  Goal: Plan of Care Review  Outcome: Ongoing, Progressing  Goal: Patient-Specific Goal (Individualized)  Outcome: Ongoing, Progressing  Goal: Absence of Hospital-Acquired Illness or Injury  Outcome: Ongoing, Progressing  Goal: Optimal Comfort and Wellbeing  Outcome: Ongoing, Progressing  Goal: Readiness for Transition of Care  Outcome: Ongoing, Progressing     Problem: Violence Risk or Actual  Goal: Anger and Impulse Control  Outcome: Ongoing, Progressing     Problem: Skin Injury Risk Increased  Goal: Skin Health and Integrity  Outcome: Ongoing, Progressing     Problem: Cognitive Impairment (Depressive Signs/Symptoms)  Goal: Optimized Cognitive Function  Outcome: Ongoing, Progressing     Problem: Decreased Participation/Engagement (Depressive Signs/Symptoms)  Goal: Increased Participation and Engagement (Depressive Signs/Symptoms)  Outcome: Ongoing, Progressing     Problem: Feelings of Worthlessness, Hopelessness or Excessive Guilt (Depressive Signs/Symptoms)  Goal: Enhanced Self-Esteem and Confidence (Depressive Signs/Symptoms)  Outcome: Ongoing, Progressing     Problem: Mood Impairment (Depressive Signs/Symptoms)  Goal: Improved Mood Symptoms (Depressive Signs/Symptoms)  Outcome: Ongoing, Progressing     Problem: Fall Injury Risk  Goal: Absence of Fall and Fall-Related Injury  Outcome: Ongoing, Progressing     Problem: Activity and Energy Impairment (Excessive Substance Use)  Goal: Optimized Energy Level (Excessive Substance Use)  Outcome: Ongoing, Progressing     Problem: Behavior Regulation Impairment (Excessive Substance Use)  Goal: Improved Behavioral Control (Excessive Substance Use)  Outcome: Ongoing, Progressing     Problem: Decreased Participation and Engagement (Excessive Substance Use)  Goal: Increased Participation and Engagement (Excessive Substance Use)  Outcome: Ongoing, Progressing     Problem: Physiologic Impairment (Excessive Substance Use)  Goal: Improved  Physiologic Symptoms (Excessive Substance Use)  Outcome: Ongoing, Progressing     Problem: Social, Occupational or Functional Impairment (Excessive Substance Use)  Goal: Enhanced Social, Occupational or Functional Skills (Excessive Substance Use)  Outcome: Ongoing, Progressing

## 2024-01-04 PROCEDURE — 25000003 PHARM REV CODE 250: Performed by: FAMILY MEDICINE

## 2024-01-04 PROCEDURE — 11400000 HC PSYCH PRIVATE ROOM

## 2024-01-04 PROCEDURE — 25000003 PHARM REV CODE 250: Performed by: PSYCHIATRY & NEUROLOGY

## 2024-01-04 RX ADMIN — DILTIAZEM HYDROCHLORIDE 120 MG: 120 CAPSULE, COATED, EXTENDED RELEASE ORAL at 08:01

## 2024-01-04 RX ADMIN — HYDROXYZINE HYDROCHLORIDE 25 MG: 25 TABLET, FILM COATED ORAL at 08:01

## 2024-01-04 RX ADMIN — ESCITALOPRAM OXALATE 20 MG: 10 TABLET ORAL at 08:01

## 2024-01-04 RX ADMIN — PREGABALIN 75 MG: 25 CAPSULE ORAL at 08:01

## 2024-01-04 RX ADMIN — ATORVASTATIN CALCIUM 40 MG: 10 TABLET, FILM COATED ORAL at 08:01

## 2024-01-04 NOTE — NURSING
"Daily Nursing Note:      Behavior:    Patient (Jessa Elizalde is a 48 y.o. female, : 1975, MRN: [id number]) demonstrating an affect that was flat. Jessa demonstrating mood that is depressed. Jessa had an appearance that was disheveled. Jessa denies suicidal ideation. Jessa denies suicide plan. Jessa denies homicidal ideation. Jessa denies hallucinations.    Jessa's  height is 5' 5" (1.651 m) and weight is 80.3 kg (177 lb 0.5 oz). Her temperature is 98.8 °F (37.1 °C). Her blood pressure is 121/82 and her pulse is 76. Her respiration is 16 and oxygen saturation is 98%.     Lorraines last BM was noted on: _______      Intervention:    Encourage Jessa to perform self-hygiene, grooming, and changing of clothing. Monitor Lorraines behavior and program compliance. Monitor Jessa for suicidal ideation, homicidal ideation, sleep disturbance, and hallucinations. Encourage Jessa to eat all portions of meals and assess for meal preferences. Monitor Jessa for intake and output to ensure hydration. Notify the Physician/Physician Assistant/Advance Practice Registered Nurse (MD/PA/APRN) for any medication refusal and any change in patient condition.      Response:    Jessa verbalizes understand of unit process and procedures. Jessa reported medications ______.      Plan:     Continue to monitor per MD/PA/APRN orders; maintain patient safety.   "
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
## 2024-01-04 NOTE — PROGRESS NOTES
Nini is a 48 female admitted for Major Depressive Disorder, recurrent, severe and Generalized Anxiety Disorder with a uds +benzos; Pt reports prescribed Xanax. CTRS met with Pt 1:1, Nini was pleasant and cooperative, reporting ability to perform her ADL's. CTRS educated Pt to TR group times and dates, with Nini agreeing to attend and participate in TR groups. Nini reported her treatment goal as coping skills.       01/03/24 0903   General   Admit Date 01/02/24   Primary Diagnosis Major Depressive Disorder, recurrent, severe   Secondary Diagnosis Generalized Anxiety Disorder   Latter-day Confucianist   Number of Children 4   Children Living? 4   Occupation unemployed   Does the patient have dentures? No   If you were to take part in activities, which of the following would you prefer? Both   Do you feel like you have enough to keep you busy now? Yes   Do you believe that you have the opportunity for physical activity? Yes   Activity Capabilities Moderate   Subjective   Patient states I got upset and took too much Iisinopril, I dont even remember (SA at [facility])   Precautions   General Precautions   (HTN)   Assessment   Mobility ambulates independently   Transfers independently   Musculoskeletal pain  (c/o Fibromyalgia)   Visual Acuity normal vision   Visual Perception depth perception   Hearing normal   Speech/Communication normal   Cognitive Concerns oriented x4;short term memory loss   Emotional Concerns appears depressed   Leisure Interest Survey   Leisure Interest Survey Yes   Social/Group Activities   Religion/Muslim Current Interest   Shopping Current Interest   Restaurant Current Interest   Solitary Activities   Watching TV Current Interest   Computer Activities Current Interest   Word Search Puzzles Current Interest   Watching Videos Current Interest   Music Listening Current Interest   Reading Current Interest   Physical Activities   Fitness/Exercise Programs Current Interest   Walk/Run Current Interest   Creative  Activities   Needlework Current Interest   Cooking Current Interest   Other Creative Activity   (colouring)   Spectator Events   Concerts Past Interest   Movies Past Interest   Sporting Events Past Interest   Passive Games   Trivia Games Past Interest   Educational Games Past Interest   Social Board Games Past Interest   Classic Board Games Past Interest   Bingo Past Interest   Card Games Past Interest   Goals   Additional Documentation yes   Goal Formulation With patient   Time For Goal Achievement 7 days   Goal 1 coping skills   Goal 1-Progress ongoing- progressing, progressing, progressing,   Plan   Planned Therapy Intervention Group Recreational Therapy   Expected Length of Stay 5-7days   PT Frequency Minimum of 3 visits per week

## 2024-01-04 NOTE — NURSING
Pt is currently voicing no ADRs or physical complaints at this time, pt did report having restless legs last night which interrupted her sleep, seen by SHAHEED Carrera, he ordered generic Mirapex for pt for RLS, currently voicing no suicidal or homicidal ideations at this time, reports depression and anxiety have improved, voicing no hallucinations or delusions at this time, voicing no detox symptoms at this time, 1 on 1 observation was changed to line of sight, will monitor with suicide prec., for anger, psychosis and detox symptoms and will be assisted prn.

## 2024-01-05 PROCEDURE — 11400000 HC PSYCH PRIVATE ROOM

## 2024-01-05 PROCEDURE — 25000003 PHARM REV CODE 250

## 2024-01-05 PROCEDURE — 25000003 PHARM REV CODE 250: Performed by: FAMILY MEDICINE

## 2024-01-05 PROCEDURE — 25000003 PHARM REV CODE 250: Performed by: PSYCHIATRY & NEUROLOGY

## 2024-01-05 RX ORDER — PRAMIPEXOLE DIHYDROCHLORIDE 0.25 MG/1
0.25 TABLET ORAL NIGHTLY
Status: DISCONTINUED | OUTPATIENT
Start: 2024-01-05 | End: 2024-01-09 | Stop reason: HOSPADM

## 2024-01-05 RX ADMIN — PREGABALIN 75 MG: 25 CAPSULE ORAL at 09:01

## 2024-01-05 RX ADMIN — LISINOPRIL 10 MG: 10 TABLET ORAL at 06:01

## 2024-01-05 RX ADMIN — HYDROXYZINE HYDROCHLORIDE 25 MG: 25 TABLET, FILM COATED ORAL at 09:01

## 2024-01-05 RX ADMIN — ATORVASTATIN CALCIUM 40 MG: 10 TABLET, FILM COATED ORAL at 09:01

## 2024-01-05 RX ADMIN — DILTIAZEM HYDROCHLORIDE 120 MG: 120 CAPSULE, COATED, EXTENDED RELEASE ORAL at 09:01

## 2024-01-05 RX ADMIN — PREGABALIN 75 MG: 25 CAPSULE ORAL at 08:01

## 2024-01-05 RX ADMIN — HYDROXYZINE HYDROCHLORIDE 25 MG: 25 TABLET, FILM COATED ORAL at 08:01

## 2024-01-05 RX ADMIN — PRAMIPEXOLE DIHYDROCHLORIDE 0.25 MG: 0.25 TABLET ORAL at 08:01

## 2024-01-05 RX ADMIN — ESCITALOPRAM OXALATE 20 MG: 10 TABLET ORAL at 09:01

## 2024-01-05 NOTE — NURSING
Pt is currently voicing no ADRs or physical complaints at this time, vital signs are stable, pt  Is not in any physical distress at the current time,  Currently voicing no suicidal or homicidal ideations at this time, reports decrease in depression and anxiety, voicing no hallucinations or delusions at this time, voicing no detox symptoms at this time, pt was seen earlier today  By SHAHEED Carrera, pt continues with LOS, will monitor  With suicide prec., for anger, psychosis and detox symptoms and will be assisted prn.

## 2024-01-05 NOTE — PROGRESS NOTES
1/5/2024  Jessa Elizalde   1975   [id number]        Psychiatry Progress Note     Chief Complaint: Im feeling better    SUBJECTIVE:   Jessa Elizalde is a 48 y.o. female placed under a PEC at [facility] after taking approximately 15 benazepril tablets and about 10 lorazepam tablets.      Today patient states that she feels much better and her affect is also improved. Patient is tolerating her medication well without issue. Denies any suicidal thoughts. Patient is participating in group activities. Will continue with current POC and monitor for need to augment.           Current Medications:   Scheduled Meds:    atorvastatin  40 mg Oral Daily    diltiaZEM  120 mg Oral Daily    EScitalopram oxalate  20 mg Oral Daily    hydrOXYzine HCL  25 mg Oral BID    lisinopriL  10 mg Oral Before breakfast    pramipexole  0.25 mg Oral QHS    pregabalin  75 mg Oral BID      PRN Meds: acetaminophen, aluminum-magnesium hydroxide-simethicone, haloperidoL **AND** diphenhydrAMINE **AND** LORazepam **AND** haloperidol lactate **AND** diphenhydrAMINE **AND** lorazepam, hydrOXYzine HCL, traZODone   Psychotherapeutics (From admission, onward)      Start     Stop Route Frequency Ordered    01/03/24 1030  EScitalopram oxalate tablet 20 mg         -- Oral Daily 01/03/24 0919    01/02/24 1734  haloperidoL tablet 10 mg  (Med - Acute  Behavioral Management)        See Hyperspace for full Linked Orders Report.    -- Oral Every 4 hours PRN 01/02/24 1638    01/02/24 1734  LORazepam tablet 2 mg  (Med - Acute  Behavioral Management)        See Hyperspace for full Linked Orders Report.    -- Oral Every 4 hours PRN 01/02/24 1638    01/02/24 1734  haloperidol lactate injection 10 mg  (Med - Acute  Behavioral Management)        See Hyperspace for full Linked Orders Report.    -- IM Every 4 hours PRN 01/02/24 1638    01/02/24 1734  LORazepam injection 2 mg  (Med - Acute  Behavioral Management)        See Hyperspace for full Linked  Orders Report.    -- IM Every 4 hours PRN 01/02/24 1638    01/02/24 1734  traZODone tablet 100 mg         -- Oral Nightly PRN 01/02/24 1638            Allergies:   Review of patient's allergies indicates:   Allergen Reactions    Milk containing products (dairy)      But can eat cheeses and ice cream with no problems    Sulfa (sulfonamide antibiotics)         OBJECTIVE:   Vitals   Vitals:    01/05/24 0903   BP: 124/85   Pulse: 83   Resp:    Temp: 98.5 °F (36.9 °C)        Labs/Imaging/Studies:   No results found for this or any previous visit (from the past 36 hour(s)).       Medical Review Of Systems:  A comprehensive review of systems was negative.      Psychiatric Mental Status Exam:  General Appearance: appears stated age, well-developed, well-nourished  Arousal: alert  Behavior: cooperative  Movements and Motor Activity: no abnormal involuntary movements noted  Orientation: oriented to person, place, time, and situation  Speech: normal rate, normal rhythm, normal volume, normal tone  Mood: Depressed  Affect: constricted, tearful  Thought Process: linear  Associations: intact  Thought Content and Perceptions: denies suicidal ideation but there was a recent attempt, no homicidal ideation, no auditory hallucinations, no visual hallucinations, no paranoid ideation, no ideas of reference  Recent and Remote Memory: recent memory intact, remote memory intact; per interview/observation with patient  Attention and Concentration: intact, attentive to conversation; per interview/observation with patient  Fund of Knowledge: intact, aware of current events, vocabulary appropriate; based on history, vocabulary, fund of knowledge, syntax, grammar, and content  Insight: questionable; based on understanding of severity of illness and HPI  Judgment: questionable; based on patient's behavior and HPI    ASSESSMENT/PLAN:   Problems Addressed/Diagnoses:  Major Depressive Disorder, recurrent, severe (F33.2)  Generalized Anxiety  Disorder (F41.1)       Past Medical History:   Diagnosis Date    Hyperlipidemia     Hypertension         Plan:  Depression, chronic with acute exacerbation  -Continue Lexapro  -Wellbutrin XL 150mg daily     Anxiety, chronic with acute exacerbation  -Hydroxyzine 25mg BID    Restless leg  -Continue pramipexole 0.25 mg HS      Expected Disposition Plan: Home        Osito GUERREROP-NP

## 2024-01-05 NOTE — GROUP NOTE
Group Psychotherapy       Group Focus: Stress Management   Group Topic: Stress Management/Crisis Plan. Therapist assisted with understanding of treatment plan, identification of responsibilities for actions, assisted patients in identifying sources of support and developing awareness of crisis symptoms.    Number of patients in attendance: 5    Group Start Time: 1045  Group End Time:  1130  Groups Date: 1/5/2024  Group Topic:  Behavioral Health  Group Department: [facility] Behavioral Health Unit  Group Facilitators:  Luz Amador  _____________________________________________________________________    Patient Name: Jessa Elizalde  MRN: [id number]  Patient Class: IP- Psych   Admission Date\Time: 1/2/2024  4:40 PM  Hospital Length of Stay: 3  Primary Care Provider: Isela, Primary Doctor     Referred by: Acute Psychiatry Unit Treatment Team     Target symptoms: Depression     Patient's response to treatment: Self-disclosure     Progress toward goals: Progressing adequately     Interval History:      Diagnosis:      Plan: Continue treatment on APU

## 2024-01-06 PROBLEM — F32.A DEPRESSION: Status: RESOLVED | Noted: 2024-01-02 | Resolved: 2024-01-06

## 2024-01-06 PROCEDURE — 25000003 PHARM REV CODE 250: Performed by: FAMILY MEDICINE

## 2024-01-06 PROCEDURE — 25000003 PHARM REV CODE 250

## 2024-01-06 PROCEDURE — 25000003 PHARM REV CODE 250: Performed by: PSYCHIATRY & NEUROLOGY

## 2024-01-06 PROCEDURE — 11400000 HC PSYCH PRIVATE ROOM

## 2024-01-06 RX ADMIN — ACETAMINOPHEN 325MG 650 MG: 325 TABLET ORAL at 01:01

## 2024-01-06 RX ADMIN — PREGABALIN 75 MG: 25 CAPSULE ORAL at 08:01

## 2024-01-06 RX ADMIN — PRAMIPEXOLE DIHYDROCHLORIDE 0.25 MG: 0.25 TABLET ORAL at 08:01

## 2024-01-06 RX ADMIN — HYDROXYZINE HYDROCHLORIDE 25 MG: 25 TABLET, FILM COATED ORAL at 08:01

## 2024-01-06 RX ADMIN — ESCITALOPRAM OXALATE 20 MG: 10 TABLET ORAL at 08:01

## 2024-01-06 RX ADMIN — DILTIAZEM HYDROCHLORIDE 120 MG: 120 CAPSULE, COATED, EXTENDED RELEASE ORAL at 08:01

## 2024-01-06 RX ADMIN — ATORVASTATIN CALCIUM 40 MG: 10 TABLET, FILM COATED ORAL at 08:01

## 2024-01-06 NOTE — PLAN OF CARE
Problem: Adult Inpatient Plan of Care  Goal: Plan of Care Review  Outcome: Ongoing, Progressing  Goal: Patient-Specific Goal (Individualized)  Outcome: Ongoing, Progressing  Goal: Absence of Hospital-Acquired Illness or Injury  Outcome: Ongoing, Progressing  Goal: Optimal Comfort and Wellbeing  Outcome: Ongoing, Progressing  Goal: Readiness for Transition of Care  Outcome: Ongoing, Progressing     Problem: Violence Risk or Actual  Goal: Anger and Impulse Control  Outcome: Ongoing, Progressing     Problem: Skin Injury Risk Increased  Goal: Skin Health and Integrity  Outcome: Ongoing, Progressing     Problem: Cognitive Impairment (Depressive Signs/Symptoms)  Goal: Optimized Cognitive Function  Outcome: Ongoing, Progressing     Problem: Decreased Participation/Engagement (Depressive Signs/Symptoms)  Goal: Increased Participation and Engagement (Depressive Signs/Symptoms)  Outcome: Ongoing, Progressing     Problem: Feelings of Worthlessness, Hopelessness or Excessive Guilt (Depressive Signs/Symptoms)  Goal: Enhanced Self-Esteem and Confidence (Depressive Signs/Symptoms)  Outcome: Ongoing, Progressing     Problem: Mood Impairment (Depressive Signs/Symptoms)  Goal: Improved Mood Symptoms (Depressive Signs/Symptoms)  Outcome: Ongoing, Progressing     Problem: Fall Injury Risk  Goal: Absence of Fall and Fall-Related Injury  Outcome: Ongoing, Progressing     Problem: Activity and Energy Impairment (Excessive Substance Use)  Goal: Optimized Energy Level (Excessive Substance Use)  Outcome: Ongoing, Progressing     Problem: Behavior Regulation Impairment (Excessive Substance Use)  Goal: Improved Behavioral Control (Excessive Substance Use)  Outcome: Ongoing, Progressing     Problem: Physiologic Impairment (Excessive Substance Use)  Goal: Improved Physiologic Symptoms (Excessive Substance Use)  Outcome: Ongoing, Progressing     Problem: Social, Occupational or Functional Impairment (Excessive Substance Use)  Goal: Enhanced  Social, Occupational or Functional Skills (Excessive Substance Use)  Outcome: Ongoing, Progressing

## 2024-01-06 NOTE — NURSING
Pt with stomach cramps related to menstrual cycle, given Tylenol 650 mg PO prn pain, will monitor for effectiveness.

## 2024-01-06 NOTE — NURSING
"Pt is currently voicing no ADRs or physical complaints at this time, vital signs are stable, pt  Is not in any physical distress at the current time,  Currently voicing no suicidal or homicidal ideations at this time, reports depression and anxiety have improved, states, "I think I was dealing with too much, stressed out, I am better.",  Currently voicing no hallucinations or delusions at this time, voicing no detox symptoms at this time,  Compliant with medication ordered, pt was seen  Yesterday by SHAHEED Carrera.  "

## 2024-01-06 NOTE — NURSING
"Daily Nursing Note:      Behavior:    Patient (Jessa Elizalde is a 48 y.o. female, : 1975, MRN: [id number]) demonstrating an affect that was flat and anxious. Jessa demonstrating mood that is anxious. Jessa had an appearance that was disheveled. Jessa denies suicidal ideation. Jessa denies suicide plan. Jessa denies homicidal ideation. Jessa denies hallucinations.    Jessa's  height is 5' 5" (1.651 m) and weight is 80.3 kg (177 lb 0.5 oz). Her oral temperature is 98.9 °F (37.2 °C). Her blood pressure is 110/73 and her pulse is 65. Her respiration is 19 and oxygen saturation is 97%.     [place] last BM was noted on: _______      Intervention:    Encourage Jessa to perform self-hygiene, grooming, and changing of clothing. Monitor Jessa's behavior and program compliance. Monitor Jessa for suicidal ideation, homicidal ideation, sleep disturbance, and hallucinations. Encourage Jessa to eat all portions of meals and assess for meal preferences. Monitor Jessa for intake and output to ensure hydration. Notify the Physician/Physician Assistant/Advance Practice Registered Nurse (MD/PA/APRN) for any medication refusal and any change in patient condition.      Response:    Jessa verbalizes understand of unit process and procedures. Jessa reported medications ______.      Plan:     Continue to monitor per MD/PA/APRN orders; maintain patient safety.   "

## 2024-01-07 PROCEDURE — 25000003 PHARM REV CODE 250: Performed by: FAMILY MEDICINE

## 2024-01-07 PROCEDURE — 25000003 PHARM REV CODE 250: Performed by: PSYCHIATRY & NEUROLOGY

## 2024-01-07 PROCEDURE — 11400000 HC PSYCH PRIVATE ROOM

## 2024-01-07 PROCEDURE — 25000003 PHARM REV CODE 250

## 2024-01-07 RX ADMIN — ESCITALOPRAM OXALATE 20 MG: 10 TABLET ORAL at 08:01

## 2024-01-07 RX ADMIN — PREGABALIN 75 MG: 25 CAPSULE ORAL at 08:01

## 2024-01-07 RX ADMIN — LISINOPRIL 10 MG: 10 TABLET ORAL at 08:01

## 2024-01-07 RX ADMIN — DILTIAZEM HYDROCHLORIDE 120 MG: 120 CAPSULE, COATED, EXTENDED RELEASE ORAL at 08:01

## 2024-01-07 RX ADMIN — ATORVASTATIN CALCIUM 40 MG: 10 TABLET, FILM COATED ORAL at 08:01

## 2024-01-07 RX ADMIN — HYDROXYZINE HYDROCHLORIDE 25 MG: 25 TABLET, FILM COATED ORAL at 08:01

## 2024-01-07 RX ADMIN — PRAMIPEXOLE DIHYDROCHLORIDE 0.25 MG: 0.25 TABLET ORAL at 08:01

## 2024-01-07 NOTE — NURSING
"Daily Nursing Note:      Behavior:    Patient (Jessa Elizalde is a 48 y.o. female, : 1975, MRN: [id number]) demonstrating an affect that was flat and anxious. Jessa demonstrating mood that is anxious. Jessa had an appearance that was disheveled. Jessa denies suicidal ideation. Jessa denies suicide plan. Jessa denies homicidal ideation. Jessa denies hallucinations.    Jessa's  height is 5' 5" (1.651 m) and weight is 80.3 kg (177 lb 0.5 oz). Her oral temperature is 98.2 °F (36.8 °C). Her blood pressure is 125/81 and her pulse is 78. Her respiration is 19 and oxygen saturation is 97%.     [place] last BM was noted on: _______      Intervention:    Encourage Jessa to perform self-hygiene, grooming, and changing of clothing. Monitor Jessa's behavior and program compliance. Monitor Jessa for suicidal ideation, homicidal ideation, sleep disturbance, and hallucinations. Encourage Jessa to eat all portions of meals and assess for meal preferences. Monitor Jessa for intake and output to ensure hydration. Notify the Physician/Physician Assistant/Advance Practice Registered Nurse (MD/PA/APRN) for any medication refusal and any change in patient condition.      Response:    Jessa verbalizes understand of unit process and procedures. Jessa reported medications ______.      Plan:     Continue to monitor per MD/PA/APRN orders; maintain patient safety.   "

## 2024-01-07 NOTE — NURSING
Pt is currently voicing no ADRs or physical complaints at this time, vital signs are stable, pt  Is not in any physical distress at this time, currently voicing no suicidal or homicidal ideations at this time, voicing no hallucinations or delusions at this time, voicing no detox symptoms at this time, compliant with medication ordered, pt feels the medication is helping her, she will be seen tomorrow by Dr Vale, will monitor with suicide prec., for anger, psychosis and detox symptoms, pt continues with LOS.

## 2024-01-08 PROCEDURE — 25000003 PHARM REV CODE 250

## 2024-01-08 PROCEDURE — 25000003 PHARM REV CODE 250: Performed by: PSYCHIATRY & NEUROLOGY

## 2024-01-08 PROCEDURE — 25000003 PHARM REV CODE 250: Performed by: FAMILY MEDICINE

## 2024-01-08 PROCEDURE — 11400000 HC PSYCH PRIVATE ROOM

## 2024-01-08 RX ORDER — LISINOPRIL 10 MG/1
10 TABLET ORAL
Qty: 30 TABLET | Refills: 0
Start: 2024-01-09 | End: 2024-01-09

## 2024-01-08 RX ORDER — DILTIAZEM HYDROCHLORIDE 120 MG/1
120 CAPSULE, COATED, EXTENDED RELEASE ORAL DAILY
Qty: 30 CAPSULE | Refills: 0
Start: 2024-01-09 | End: 2024-01-09

## 2024-01-08 RX ORDER — HYDROXYZINE HYDROCHLORIDE 25 MG/1
25 TABLET, FILM COATED ORAL 2 TIMES DAILY
Qty: 60 TABLET | Refills: 0
Start: 2024-01-08 | End: 2024-01-09

## 2024-01-08 RX ORDER — ESCITALOPRAM OXALATE 20 MG/1
20 TABLET ORAL DAILY
Qty: 30 TABLET | Refills: 0
Start: 2024-01-09 | End: 2024-01-09

## 2024-01-08 RX ORDER — ATORVASTATIN CALCIUM 40 MG/1
40 TABLET, FILM COATED ORAL DAILY
Qty: 30 TABLET | Refills: 0
Start: 2024-01-09 | End: 2024-01-09

## 2024-01-08 RX ADMIN — LISINOPRIL 10 MG: 10 TABLET ORAL at 07:01

## 2024-01-08 RX ADMIN — PRAMIPEXOLE DIHYDROCHLORIDE 0.25 MG: 0.25 TABLET ORAL at 08:01

## 2024-01-08 RX ADMIN — PREGABALIN 75 MG: 25 CAPSULE ORAL at 08:01

## 2024-01-08 RX ADMIN — DILTIAZEM HYDROCHLORIDE 120 MG: 120 CAPSULE, COATED, EXTENDED RELEASE ORAL at 08:01

## 2024-01-08 RX ADMIN — HYDROXYZINE HYDROCHLORIDE 25 MG: 25 TABLET, FILM COATED ORAL at 08:01

## 2024-01-08 RX ADMIN — ATORVASTATIN CALCIUM 40 MG: 10 TABLET, FILM COATED ORAL at 08:01

## 2024-01-08 RX ADMIN — ESCITALOPRAM OXALATE 20 MG: 10 TABLET ORAL at 08:01

## 2024-01-08 NOTE — PROGRESS NOTES
"1/8/2024  Jessa Elizalde   1975   [id number]        Psychiatry Progress Note     Chief Complaint: "Doing good"    SUBJECTIVE:   Jessa Elizalde is a 48 y.o. female placed under a PEC at [facility] after taking approximately 15 benazepril tablets and about 10 lorazepam tablets.     Patient states that she has been doing well recently.  Affect improved.  Mood improved.  Tolerating current medication regimen without issues.  Denies thoughts of self-harm or harm to others.  No overt behavioral issues reported by staff.  Currently planned for discharge tomorrow with f/u with [facility].  Will proceed with this plan.    UDS: (+)benzodiazepines  Blood alcohol: <10    Current Medications:   Scheduled Meds:    atorvastatin  40 mg Oral Daily    diltiaZEM  120 mg Oral Daily    EScitalopram oxalate  20 mg Oral Daily    hydrOXYzine HCL  25 mg Oral BID    lisinopriL  10 mg Oral Before breakfast    pramipexole  0.25 mg Oral QHS    pregabalin  75 mg Oral BID      PRN Meds: acetaminophen, aluminum-magnesium hydroxide-simethicone, haloperidoL **AND** diphenhydrAMINE **AND** LORazepam **AND** haloperidol lactate **AND** diphenhydrAMINE **AND** lorazepam, hydrOXYzine HCL, traZODone   Psychotherapeutics (From admission, onward)      Start     Stop Route Frequency Ordered    01/03/24 1030  EScitalopram oxalate tablet 20 mg         -- Oral Daily 01/03/24 0919    01/02/24 1734  haloperidoL tablet 10 mg  (Med - Acute  Behavioral Management)        See Hyperspace for full Linked Orders Report.    -- Oral Every 4 hours PRN 01/02/24 1638    01/02/24 1734  LORazepam tablet 2 mg  (Med - Acute  Behavioral Management)        See Hyperspace for full Linked Orders Report.    -- Oral Every 4 hours PRN 01/02/24 1638    01/02/24 1734  haloperidol lactate injection 10 mg  (Med - Acute  Behavioral Management)        See Hyperspace for full Linked Orders Report.    -- IM Every 4 hours PRN 01/02/24 1638    01/02/24 " "1734  LORazepam injection 2 mg  (Med - Acute  Behavioral Management)        See Jonathon for full Linked Orders Report.    -- IM Every 4 hours PRN 01/02/24 1638    01/02/24 1734  traZODone tablet 100 mg         -- Oral Nightly PRN 01/02/24 1638            Allergies:   Review of patient's allergies indicates:   Allergen Reactions    Milk containing products (dairy)      But can eat cheeses and ice cream with no problems    Sulfa (sulfonamide antibiotics)         OBJECTIVE:   Vitals   Vitals:    01/08/24 0701   BP: 129/85   Pulse: 73   Resp: 18   Temp: 98.2 °F (36.8 °C)        Labs/Imaging/Studies:   No results found for this or any previous visit (from the past 36 hour(s)).       Medical Review Of Systems:  Constitutional: negative  Respiratory: negative  Cardiovascular: negative  Gastrointestinal: negative  Genitourinary:negative  Musculoskeletal:negative  Neurological: negative       Psychiatric Mental Status Exam:  General Appearance: appears stated age, well-developed, well-nourished  Arousal: alert  Behavior: cooperative  Movements and Motor Activity: no abnormal involuntary movements noted  Orientation: oriented to person, place, time, and situation  Speech: normal rate, normal rhythm, normal volume, normal tone  Mood: "Good"  Affect: constricted  Thought Process: linear  Associations: intact  Thought Content and Perceptions: no suicidal ideation, no homicidal ideation, no auditory hallucinations, no visual hallucinations, no paranoid ideation, no ideas of reference  Recent and Remote Memory: recent memory intact, remote memory intact; per interview/observation with patient  Attention and Concentration: intact, attentive to conversation; per interview/observation with patient  Fund of Knowledge: intact, aware of current events, vocabulary appropriate; based on history, vocabulary, fund of knowledge, syntax, grammar, and content  Insight: questionable; based on understanding of severity of illness and " HPI  Judgment: questionable; based on patient's behavior and HPI      ASSESSMENT/PLAN:   Problems Addressed/Diagnoses:  Major Depressive Disorder, recurrent, severe (F33.2)  Generalized Anxiety Disorder (F41.1)    Past Medical History:   Diagnosis Date    Hyperlipidemia     Hypertension         Plan:  Depression, chronic with acute exacerbation  -Continue Lexapro     Anxiety, chronic with acute exacerbation  -Continue Hydroxyzine 25mg BID    Expected Disposition Plan: Home        Mohamud Vale M.D.

## 2024-01-08 NOTE — NURSING
"Daily Nursing Note:      Behavior:    Patient (Jessa Elizalde is a 48 y.o. female, : 1975, MRN: [id number]) demonstrating an affect that was anxious. Jessa demonstrating mood that is anxious. Jessa had an appearance that was disheveled. Jessa denies suicidal ideation. Jessa denies suicide plan. Jessa denies homicidal ideation. Jessa denies hallucinations.    Jessa's  height is 5' 5" (1.651 m) and weight is 80.3 kg (177 lb 0.5 oz). Her temperature is 97 °F (36.1 °C). Her blood pressure is 120/70 and her pulse is 70. Her respiration is 18 and oxygen saturation is 97%.     [place] last BM was noted on: _______      Intervention:    Encourage Jessa to perform self-hygiene, grooming, and changing of clothing. Monitor Jessa's behavior and program compliance. Monitor Jessa for suicidal ideation, homicidal ideation, sleep disturbance, and hallucinations. Encourage Jessa to eat all portions of meals and assess for meal preferences. Monitor Jessa for intake and output to ensure hydration. Notify the Physician/Physician Assistant/Advance Practice Registered Nurse (MD/PA/APRN) for any medication refusal and any change in patient condition.      Response:    Jessa verbalizes understand of unit process and procedures. Jessa reported medications ______.      Plan:     Continue to monitor per MD/PA/APRN orders; maintain patient safety.   "

## 2024-01-08 NOTE — NURSING
Pt is currently voicing no ADRs or physical complaints at this time, vital signs are stable, pt  Is not in any physical distress at the current time,  Currently voicing no suicidal or homicidal ideations at this time, voicing no hallucinations or  Delusions at this time, voicing no detox symptoms  At this time, pt was seen earlier today by Dr Vale, she is tentatively planned for discharge  Tomorrow, pt feels she is ready for discharge, will  Monitor with suicide prec., for anger, psychosis and detox symptoms and will be assisted prn.

## 2024-01-09 VITALS
RESPIRATION RATE: 18 BRPM | WEIGHT: 177 LBS | HEIGHT: 65 IN | HEART RATE: 86 BPM | DIASTOLIC BLOOD PRESSURE: 82 MMHG | TEMPERATURE: 98 F | OXYGEN SATURATION: 100 % | SYSTOLIC BLOOD PRESSURE: 122 MMHG | BODY MASS INDEX: 29.49 KG/M2

## 2024-01-09 PROCEDURE — 25000003 PHARM REV CODE 250: Performed by: PSYCHIATRY & NEUROLOGY

## 2024-01-09 PROCEDURE — 25000003 PHARM REV CODE 250: Performed by: FAMILY MEDICINE

## 2024-01-09 RX ORDER — PRAMIPEXOLE DIHYDROCHLORIDE 0.25 MG/1
0.25 TABLET ORAL NIGHTLY
Qty: 30 TABLET | Refills: 0 | Status: SHIPPED | OUTPATIENT
Start: 2024-01-09 | End: 2024-02-08

## 2024-01-09 RX ORDER — HYDROXYZINE HYDROCHLORIDE 25 MG/1
25 TABLET, FILM COATED ORAL 2 TIMES DAILY
Qty: 60 TABLET | Refills: 0 | Status: SHIPPED | OUTPATIENT
Start: 2024-01-09 | End: 2024-02-08

## 2024-01-09 RX ORDER — ATORVASTATIN CALCIUM 40 MG/1
40 TABLET, FILM COATED ORAL DAILY
Qty: 30 TABLET | Refills: 0 | Status: SHIPPED | OUTPATIENT
Start: 2024-01-09 | End: 2024-02-08

## 2024-01-09 RX ORDER — LISINOPRIL 10 MG/1
10 TABLET ORAL
Qty: 30 TABLET | Refills: 0 | Status: SHIPPED | OUTPATIENT
Start: 2024-01-09 | End: 2024-02-08

## 2024-01-09 RX ORDER — ESCITALOPRAM OXALATE 20 MG/1
20 TABLET ORAL DAILY
Qty: 30 TABLET | Refills: 0 | Status: SHIPPED | OUTPATIENT
Start: 2024-01-09 | End: 2024-02-08

## 2024-01-09 RX ORDER — DILTIAZEM HYDROCHLORIDE 120 MG/1
120 CAPSULE, COATED, EXTENDED RELEASE ORAL DAILY
Qty: 30 CAPSULE | Refills: 0 | Status: SHIPPED | OUTPATIENT
Start: 2024-01-09 | End: 2024-02-08

## 2024-01-09 RX ADMIN — ATORVASTATIN CALCIUM 40 MG: 10 TABLET, FILM COATED ORAL at 08:01

## 2024-01-09 RX ADMIN — PREGABALIN 75 MG: 25 CAPSULE ORAL at 08:01

## 2024-01-09 RX ADMIN — HYDROXYZINE HYDROCHLORIDE 25 MG: 25 TABLET, FILM COATED ORAL at 08:01

## 2024-01-09 RX ADMIN — DILTIAZEM HYDROCHLORIDE 120 MG: 120 CAPSULE, COATED, EXTENDED RELEASE ORAL at 08:01

## 2024-01-09 RX ADMIN — ESCITALOPRAM OXALATE 20 MG: 10 TABLET ORAL at 08:01

## 2024-01-09 NOTE — PLAN OF CARE
Problem: Adult Inpatient Plan of Care  Goal: Plan of Care Review  Outcome: Met  Goal: Patient-Specific Goal (Individualized)  Outcome: Met  Goal: Absence of Hospital-Acquired Illness or Injury  Outcome: Met  Goal: Optimal Comfort and Wellbeing  Outcome: Met  Goal: Readiness for Transition of Care  Outcome: Met     Problem: Violence Risk or Actual  Goal: Anger and Impulse Control  Outcome: Met     Problem: Skin Injury Risk Increased  Goal: Skin Health and Integrity  Outcome: Met     Problem: Cognitive Impairment (Depressive Signs/Symptoms)  Goal: Optimized Cognitive Function  Outcome: Met     Problem: Decreased Participation/Engagement (Depressive Signs/Symptoms)  Goal: Increased Participation and Engagement (Depressive Signs/Symptoms)  Outcome: Met     Problem: Feelings of Worthlessness, Hopelessness or Excessive Guilt (Depressive Signs/Symptoms)  Goal: Enhanced Self-Esteem and Confidence (Depressive Signs/Symptoms)  Outcome: Met     Problem: Mood Impairment (Depressive Signs/Symptoms)  Goal: Improved Mood Symptoms (Depressive Signs/Symptoms)  Outcome: Met     Problem: Fall Injury Risk  Goal: Absence of Fall and Fall-Related Injury  Outcome: Met     Problem: Activity and Energy Impairment (Excessive Substance Use)  Goal: Optimized Energy Level (Excessive Substance Use)  Outcome: Met     Problem: Behavior Regulation Impairment (Excessive Substance Use)  Goal: Improved Behavioral Control (Excessive Substance Use)  Outcome: Met     Problem: Decreased Participation and Engagement (Excessive Substance Use)  Goal: Increased Participation and Engagement (Excessive Substance Use)  Outcome: Met     Problem: Physiologic Impairment (Excessive Substance Use)  Goal: Improved Physiologic Symptoms (Excessive Substance Use)  Outcome: Met     Problem: Social, Occupational or Functional Impairment (Excessive Substance Use)  Goal: Enhanced Social, Occupational or Functional Skills (Excessive Substance Use)  Outcome: Met

## 2024-01-09 NOTE — NURSING
"Daily Nursing Note:      Behavior:    Patient (Jessa Elizalde is a 48 y.o. female, : 1975, MRN: [id number]) demonstrating an affect that was flat and anxious. Jessa demonstrating mood that is anxious. Jessa had an appearance that was disheveled. Jessa denies suicidal ideation. Jessa denies suicide plan. Jessa denies homicidal ideation. Jessa denies hallucinations.    Jessa's  height is 5' 5" (1.651 m) and weight is 80.3 kg (177 lb 0.5 oz). Her oral temperature is 98.2 °F (36.8 °C). Her blood pressure is 129/85 and her pulse is 73. Her respiration is 18 and oxygen saturation is 98%.     [place] last BM was noted on: _______      Intervention:    Encourage Jessa to perform self-hygiene, grooming, and changing of clothing. Monitor Jessa's behavior and program compliance. Monitor Jessa for suicidal ideation, homicidal ideation, sleep disturbance, and hallucinations. Encourage Jessa to eat all portions of meals and assess for meal preferences. Monitor Jessa for intake and output to ensure hydration. Notify the Physician/Physician Assistant/Advance Practice Registered Nurse (MD/PA/APRN) for any medication refusal and any change in patient condition.      Response:    Jessa verbalizes understand of unit process and procedures. Jessa reported medications ______.      Plan:     Continue to monitor per MD/PA/APRN orders; maintain patient safety.   "

## 2024-01-09 NOTE — NURSING
Pt is scheduled for discharge today, currently voicing no ADRs or physical complaints at this time, vital signs are stable, pt is not in any physical distress at the current time, currently voicing no suicidal or homicidal ideations at this time, voicing no hallucinations or delusions at this time, voicing no detox symptoms at this time,  Pt was given dc instructions, voicing understanding, pt will receive a dc packet, it will contain RX for dc meds, aftercare appts, lab work, and educational material. Pt states she is  Going home and her family will pick her up, pt's belongings were packed by mht, pt's home meds will be returned to her, pt will be brought up to the dc area when her transportation arrives.

## 2024-01-09 NOTE — PLAN OF CARE
Nini met reported treatment goal of coping skills  CTRS Discharge Recommendations:  Encouraged Pt. to actively utilize available community resources to increase leisure involvement to decrease signs and symptoms of illness.  Encouraged Pt. to utilize coping skills on a regular basis to reduce the risk of decomposition and re-hospitalization.
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
## 2024-07-03 NOTE — NURSING
"Daily Nursing Note:      Behavior:    Patient (Jessa Elizalde is a 48 y.o. female, : 1975, MRN: [id number]) demonstrating an affect that was flat. Jessa demonstrating mood that is depressed. Jessa had an appearance that was disheveled. Jessa denies suicidal ideation. Jessa denies suicide plan. Jessa denies homicidal ideation. Jessa denies hallucinations.    Jessa's  height is 5' 5" (1.651 m) and weight is 80.3 kg (177 lb 0.5 oz). Her temperature is 98.9 °F (37.2 °C). Her blood pressure is 112/72 and her pulse is 75. Her respiration is 20 and oxygen saturation is 94% (abnormal).     [place] last BM was noted on: _______      Intervention:    Encourage Jessa to perform self-hygiene, grooming, and changing of clothing. Monitor Jessa's behavior and program compliance. Monitor Jessa for suicidal ideation, homicidal ideation, sleep disturbance, and hallucinations. Encourage Jessa to eat all portions of meals and assess for meal preferences. Monitor Jessa for intake and output to ensure hydration. Notify the Physician/Physician Assistant/Advance Practice Registered Nurse (MD/PA/APRN) for any medication refusal and any change in patient condition.      Response:    Jessa verbalizes understand of unit process and procedures. Jessa reported medications ______.      Plan:     Continue to monitor per MD/PA/APRN orders; maintain patient safety.   " See MTM encounter 7/2/24. Providing one month of refill for Adderall ER 40mg daily to bridge until psychiatry visit on 8/16/24. She does have a couple weeks of remaining supply at home .    Pended Rx and routed to Dr. Kendrick Estrada, PharmD  Medication Therapy Management Pharmacist  [facility]